# Patient Record
Sex: MALE | Race: WHITE
[De-identification: names, ages, dates, MRNs, and addresses within clinical notes are randomized per-mention and may not be internally consistent; named-entity substitution may affect disease eponyms.]

---

## 2017-06-29 ENCOUNTER — HOSPITAL ENCOUNTER (OUTPATIENT)
Dept: HOSPITAL 35 - RAD | Age: 67
End: 2017-06-29
Attending: FAMILY MEDICINE
Payer: COMMERCIAL

## 2017-06-29 DIAGNOSIS — N20.0: Primary | ICD-10-CM

## 2021-03-08 ENCOUNTER — HOSPITAL ENCOUNTER (OUTPATIENT)
Dept: HOSPITAL 35 - ULTRA | Age: 71
End: 2021-03-08
Attending: FAMILY MEDICINE
Payer: COMMERCIAL

## 2021-03-08 DIAGNOSIS — G45.9: Primary | ICD-10-CM

## 2021-03-08 DIAGNOSIS — I63.9: ICD-10-CM

## 2021-03-08 DIAGNOSIS — R09.89: ICD-10-CM

## 2021-03-25 ENCOUNTER — HOSPITAL ENCOUNTER (OUTPATIENT)
Dept: HOSPITAL 35 - CAT | Age: 71
End: 2021-03-25
Attending: FAMILY MEDICINE
Payer: COMMERCIAL

## 2021-03-25 DIAGNOSIS — I65.21: Primary | ICD-10-CM

## 2021-03-25 DIAGNOSIS — R09.89: ICD-10-CM

## 2021-03-25 LAB
BUN SERPL-MCNC: 25 MG/DL (ref 7–18)
CREAT SERPL-MCNC: 1.2 MG/DL (ref 0.7–1.3)

## 2021-04-06 ENCOUNTER — HOSPITAL ENCOUNTER (OUTPATIENT)
Dept: HOSPITAL 35 - CAT | Age: 71
End: 2021-04-06
Attending: INTERNAL MEDICINE
Payer: COMMERCIAL

## 2021-04-06 ENCOUNTER — HOSPITAL ENCOUNTER (OUTPATIENT)
Dept: HOSPITAL 35 - SJCVC | Age: 71
End: 2021-04-06
Attending: RADIOLOGY
Payer: COMMERCIAL

## 2021-04-06 DIAGNOSIS — E78.2: ICD-10-CM

## 2021-04-06 DIAGNOSIS — Z13.6: Primary | ICD-10-CM

## 2021-04-06 DIAGNOSIS — E78.00: ICD-10-CM

## 2021-04-06 DIAGNOSIS — E11.9: ICD-10-CM

## 2021-04-06 DIAGNOSIS — Z88.5: ICD-10-CM

## 2021-04-06 DIAGNOSIS — I10: ICD-10-CM

## 2021-04-06 DIAGNOSIS — Z79.899: ICD-10-CM

## 2021-04-06 DIAGNOSIS — Z79.84: ICD-10-CM

## 2021-04-06 DIAGNOSIS — I77.9: Primary | ICD-10-CM

## 2021-04-06 DIAGNOSIS — I65.23: ICD-10-CM

## 2021-04-06 DIAGNOSIS — Z87.891: ICD-10-CM

## 2021-04-06 DIAGNOSIS — I25.10: ICD-10-CM

## 2021-04-06 DIAGNOSIS — R07.9: ICD-10-CM

## 2021-04-06 DIAGNOSIS — Z72.89: ICD-10-CM

## 2021-04-14 ENCOUNTER — HOSPITAL ENCOUNTER (INPATIENT)
Dept: HOSPITAL 35 - CATH | Age: 71
LOS: 9 days | Discharge: HOME | DRG: 233 | End: 2021-04-23
Attending: RADIOLOGY | Admitting: RADIOLOGY
Payer: COMMERCIAL

## 2021-04-14 VITALS — DIASTOLIC BLOOD PRESSURE: 68 MMHG | SYSTOLIC BLOOD PRESSURE: 137 MMHG

## 2021-04-14 VITALS — SYSTOLIC BLOOD PRESSURE: 136 MMHG | DIASTOLIC BLOOD PRESSURE: 62 MMHG

## 2021-04-14 VITALS — WEIGHT: 185.6 LBS | HEIGHT: 67.99 IN | BODY MASS INDEX: 28.13 KG/M2

## 2021-04-14 VITALS — SYSTOLIC BLOOD PRESSURE: 139 MMHG | DIASTOLIC BLOOD PRESSURE: 67 MMHG

## 2021-04-14 VITALS — DIASTOLIC BLOOD PRESSURE: 59 MMHG | SYSTOLIC BLOOD PRESSURE: 125 MMHG

## 2021-04-14 VITALS — SYSTOLIC BLOOD PRESSURE: 129 MMHG | DIASTOLIC BLOOD PRESSURE: 65 MMHG

## 2021-04-14 VITALS — DIASTOLIC BLOOD PRESSURE: 66 MMHG | SYSTOLIC BLOOD PRESSURE: 137 MMHG

## 2021-04-14 DIAGNOSIS — Z81.8: ICD-10-CM

## 2021-04-14 DIAGNOSIS — D62: ICD-10-CM

## 2021-04-14 DIAGNOSIS — E78.5: ICD-10-CM

## 2021-04-14 DIAGNOSIS — Z20.822: ICD-10-CM

## 2021-04-14 DIAGNOSIS — E43: ICD-10-CM

## 2021-04-14 DIAGNOSIS — E11.9: ICD-10-CM

## 2021-04-14 DIAGNOSIS — I25.10: Primary | ICD-10-CM

## 2021-04-14 DIAGNOSIS — Z79.899: ICD-10-CM

## 2021-04-14 DIAGNOSIS — Z83.3: ICD-10-CM

## 2021-04-14 DIAGNOSIS — Z88.6: ICD-10-CM

## 2021-04-14 DIAGNOSIS — Z79.82: ICD-10-CM

## 2021-04-14 DIAGNOSIS — I65.21: ICD-10-CM

## 2021-04-14 DIAGNOSIS — I10: ICD-10-CM

## 2021-04-14 LAB
ALBUMIN SERPL-MCNC: 3.3 G/DL (ref 3.4–5)
ALT SERPL-CCNC: 32 U/L (ref 30–65)
ANION GAP SERPL CALC-SCNC: 10 MMOL/L (ref 7–16)
ANION GAP SERPL CALC-SCNC: 9 MMOL/L (ref 7–16)
APTT BLD: 27 SECONDS (ref 24.5–32.8)
AST SERPL-CCNC: 25 U/L (ref 15–37)
BASOPHILS NFR BLD AUTO: 0.8 % (ref 0–2)
BILIRUB SERPL-MCNC: 0.5 MG/DL (ref 0.2–1)
BILIRUB UR-MCNC: NEGATIVE MG/DL
BUN SERPL-MCNC: 18 MG/DL (ref 7–18)
BUN SERPL-MCNC: 21 MG/DL (ref 7–18)
CALCIUM SERPL-MCNC: 8.4 MG/DL (ref 8.5–10.1)
CALCIUM SERPL-MCNC: 9.2 MG/DL (ref 8.5–10.1)
CHLORIDE SERPL-SCNC: 105 MMOL/L (ref 98–107)
CHLORIDE SERPL-SCNC: 107 MMOL/L (ref 98–107)
CO2 SERPL-SCNC: 26 MMOL/L (ref 21–32)
CO2 SERPL-SCNC: 28 MMOL/L (ref 21–32)
COLOR UR: YELLOW
CREAT SERPL-MCNC: 1.1 MG/DL (ref 0.7–1.3)
CREAT SERPL-MCNC: 1.2 MG/DL (ref 0.7–1.3)
EOSINOPHIL NFR BLD: 3.7 % (ref 0–3)
ERYTHROCYTE [DISTWIDTH] IN BLOOD BY AUTOMATED COUNT: 13.2 % (ref 10.5–14.5)
GLUCOSE SERPL-MCNC: 140 MG/DL (ref 74–106)
GLUCOSE SERPL-MCNC: 176 MG/DL (ref 74–106)
GRANULOCYTES NFR BLD MANUAL: 54.7 % (ref 36–66)
HCT VFR BLD CALC: 44.9 % (ref 42–52)
HGB BLD-MCNC: 15.1 GM/DL (ref 14–18)
INR PPP: 1.05
KETONES UR STRIP-MCNC: NEGATIVE MG/DL
LYMPHOCYTES NFR BLD AUTO: 32.2 % (ref 24–44)
MCH RBC QN AUTO: 33.9 PG (ref 26–34)
MCHC RBC AUTO-ENTMCNC: 33.6 G/DL (ref 28–37)
MCV RBC: 100.8 FL (ref 80–100)
MONOCYTES NFR BLD: 8.6 % (ref 1–8)
NEUTROPHILS # BLD: 3.4 THOU/UL (ref 1.4–8.2)
PLATELET # BLD: 180 THOU/UL (ref 150–400)
POTASSIUM SERPL-SCNC: 4.5 MMOL/L (ref 3.5–5.1)
POTASSIUM SERPL-SCNC: 4.6 MMOL/L (ref 3.5–5.1)
PROT SERPL-MCNC: 6 G/DL (ref 6.4–8.2)
PROTHROMBIN TIME: 11.4 SECONDS (ref 9.3–11.4)
RBC # BLD AUTO: 4.46 MIL/UL (ref 4.5–6)
RBC # UR STRIP: NEGATIVE /UL
SODIUM SERPL-SCNC: 141 MMOL/L (ref 136–145)
SODIUM SERPL-SCNC: 144 MMOL/L (ref 136–145)
SP GR UR STRIP: 1.01 (ref 1–1.03)
URINE CLARITY: CLEAR
URINE GLUCOSE-RANDOM*: (no result)
URINE LEUKOCYTES-REFLEX: NEGATIVE
URINE NITRITE-REFLEX: NEGATIVE
URINE PROTEIN (DIPSTICK): NEGATIVE
UROBILINOGEN UR STRIP-ACNC: 0.2 E.U./DL (ref 0.2–1)
WBC # BLD AUTO: 6.3 THOU/UL (ref 4–11)

## 2021-04-14 PROCEDURE — B4181ZZ FLUOROSCOPY OF BILATERAL RENAL ARTERIES USING LOW OSMOLAR CONTRAST: ICD-10-PCS

## 2021-04-14 PROCEDURE — 50101: CPT

## 2021-04-14 PROCEDURE — 52131: CPT

## 2021-04-14 PROCEDURE — 52279: CPT

## 2021-04-14 PROCEDURE — B2151ZZ FLUOROSCOPY OF LEFT HEART USING LOW OSMOLAR CONTRAST: ICD-10-PCS

## 2021-04-14 PROCEDURE — 83006 GROWTH STIMULATION GENE 2: CPT

## 2021-04-14 PROCEDURE — B4101ZZ FLUOROSCOPY OF ABDOMINAL AORTA USING LOW OSMOLAR CONTRAST: ICD-10-PCS | Performed by: RADIOLOGY

## 2021-04-14 PROCEDURE — 4A023N7 MEASUREMENT OF CARDIAC SAMPLING AND PRESSURE, LEFT HEART, PERCUTANEOUS APPROACH: ICD-10-PCS | Performed by: INTERNAL MEDICINE

## 2021-04-14 PROCEDURE — 50386 REMOVE STENT VIA TRANSURETH: CPT

## 2021-04-14 PROCEDURE — 10203: CPT

## 2021-04-14 PROCEDURE — 62900: CPT

## 2021-04-14 PROCEDURE — 52259: CPT

## 2021-04-14 PROCEDURE — 53358: CPT

## 2021-04-14 PROCEDURE — B41F1ZZ FLUOROSCOPY OF RIGHT LOWER EXTREMITY ARTERIES USING LOW OSMOLAR CONTRAST: ICD-10-PCS

## 2021-04-14 PROCEDURE — 50010 RENAL EXPLORATION: CPT

## 2021-04-14 PROCEDURE — B2111ZZ FLUOROSCOPY OF MULTIPLE CORONARY ARTERIES USING LOW OSMOLAR CONTRAST: ICD-10-PCS

## 2021-04-14 PROCEDURE — 47000 NEEDLE BIOPSY OF LIVER PERQ: CPT

## 2021-04-14 PROCEDURE — 48889: CPT

## 2021-04-14 PROCEDURE — 47002: CPT

## 2021-04-14 PROCEDURE — 62110: CPT

## 2021-04-14 PROCEDURE — 62950: CPT

## 2021-04-14 PROCEDURE — 47001 NDL BIOPSY LVR TM OTH MAJ PX: CPT

## 2021-04-14 PROCEDURE — 47297: CPT

## 2021-04-14 PROCEDURE — B3151ZZ FLUOROSCOPY OF BILATERAL COMMON CAROTID ARTERIES USING LOW OSMOLAR CONTRAST: ICD-10-PCS

## 2021-04-14 PROCEDURE — 50249: CPT

## 2021-04-14 PROCEDURE — 65020: CPT

## 2021-04-14 PROCEDURE — 65090: CPT

## 2021-04-14 PROCEDURE — 10078: CPT

## 2021-04-14 PROCEDURE — 50417: CPT

## 2021-04-14 PROCEDURE — 10081 I&D PILONIDAL CYST COMP: CPT

## 2021-04-14 PROCEDURE — B31F1ZZ FLUOROSCOPY OF LEFT VERTEBRAL ARTERY USING LOW OSMOLAR CONTRAST: ICD-10-PCS

## 2021-04-14 PROCEDURE — 50455: CPT

## 2021-04-14 PROCEDURE — 47375: CPT

## 2021-04-14 PROCEDURE — 50668: CPT

## 2021-04-14 PROCEDURE — 30233R1 TRANSFUSION OF NONAUTOLOGOUS PLATELETS INTO PERIPHERAL VEIN, PERCUTANEOUS APPROACH: ICD-10-PCS

## 2021-04-14 PROCEDURE — 65120: CPT

## 2021-04-14 PROCEDURE — 65135 INSERT OCULAR IMPLANT: CPT

## 2021-04-14 PROCEDURE — 50011: CPT

## 2021-04-14 PROCEDURE — B41G1ZZ FLUOROSCOPY OF LEFT LOWER EXTREMITY ARTERIES USING LOW OSMOLAR CONTRAST: ICD-10-PCS

## 2021-04-14 PROCEDURE — 52287 CYSTOSCOPY CHEMODENERVATION: CPT

## 2021-04-14 PROCEDURE — 51301: CPT

## 2021-04-14 NOTE — EKG
[place] [address] [facility] [email or web address]
[place]  [address]
Phone:  [phone]                    ELECTROCARDIOGRAM REPORT      
_______________________________________________________________________________
 
Name:       EILEEN WALTERS [facility]           [facility] #:                     [facility]      [id number]     Account #:      [id number]  
Admission:              Attend Phys:    Liu Savage MD  
Discharge:              Date of Birth:  [birth year]  
                                                          Report #: [id number]
   [id number]
_______________________________________________________________________________
                          [facility]
                                       
Test Date:    2021               Test Time:    11:23:38
Pat Name:     EILEEN WALTERS             Department:   
Patient ID:   [id number]            Room:          
Gender:       M                        Technician:   SADAF
:          1950               Requested By: Aquiles Joiner
Order Number: [id number] MD:   Herve Alvarado
                                 Measurements
Intervals                              Axis          
Rate:         65                       P:            68
MI:           167                      QRS:          53
QRSD:         96                       T:            24
QT:           401                                    
QTc:          417                                    
                           Interpretive Statements
Sinus rhythm
Abnormal R-wave progression, late transition
No previous ECG available for comparison
Electronically Signed On 2021 15:13:53 CDT by Herve Alvarado
[email or web address]
 
 
 
 
 
 
 
 
 
 
 
 
 
 
 
 
 
 
 
 
 
 
  <ELECTRONICALLY SIGNED>
   By: Herve Alvarado MD, [facility]   
  21     1513
D: 21 1123                           _____________________________________
T: 21 1123                           Herve Alvarado MD, FAC     /EPI

## 2021-04-14 NOTE — NUR
ASSUMED CARE OF PT AT APPROX 1700 FROM IR. ADMISSION COMPLETE. PT ORIENTED TO
ROOM. R GROIN CDI, NO BRUISING OR HEMATOMA. FLUIDS INFUSING. BEDREST COMPLETE
AT 1830. PLAN FOR ENDARTERECTOMY TOMORROW. WILL CONTINUE TO MONITOR FOR
CHANGES AND FOLLOW POC.

## 2021-04-14 NOTE — CATHLAB
[facility]
[address]
[place]   [address]                   INVASIVE PROCEDURE REPORT     
_______________________________________________________________________________
 
Name:       EILEEN WALTERS [facility]           Room #:         219-P       [facility] 
M.R.#:      [id number]                       Account #:      [id number]  
Admission:  04/14/21    Attend Phys:    Liu Savage MD  
Discharge:              Date of Birth:  [birth year]  
                                                          Report #: [id number]
                                                                    [id number]
_______________________________________________________________________________
THIS REPORT FOR:  
 
cc:  Bhanu Hutchinson MD, Neal A. MD Mancuso, Gerald M. MD [facility]                                        
                                                                       ~
 
--------------- APPROVED REPORT --------------
 
 
Study performed:  04/14/2021 14:38:20
 
Patient Details
Patient Status: Out-Patient                  Room #: 
The patient is a 70 year-old male
 
Event Personnel
Vini Turenr RN RN, Tenisha Leach RN RN, Pricilla Little RN RN, 
Swapna Flores [place], Sherra RTR Monitor, Marylu García RN RN, Aquiles Joiner  Interventional Cardiologist
 
Procedures Performed
Left Heart Cath w/or w/o Coronaries [id number] [facility] [address]107 Initial Mod Sed 
Same Phys/QHP [facility] [id number] 70004 Mod Sed Same Phys/QHP Ea 
[id number]
 
Procedure Narrative
The was infiltrated with 1% Lidocaine subcutaneous anesthesia. A 
SHEATH BRITE-TIP 6F X 11CM ([id number]) sheath was inserted into the 
RFA^. Coronary angiography was performed using coronary diagnostic 
catheters. The right coronary system was accessed and visualized with 
a JR4 catheter. The left coronary system was accessed and visualized 
with a JL4 catheter. The left ventricle was accessed and visualized 
with a PIGTAIL catheter. Left ventriculogram was performed in 30 
degree projection. Closure device was deployed with a 6 Fr MYNXGRIP 
6/7F #[id number]. Hemostasis was obtained with manual pressure following 
sheath removal without any complications. The patient tolerated the 
procedure well and there were no complications associated with the 
procedure. There was no hematoma.
 
Intraoperative Conscious Sedation
Sedation start time:  1320           Case end Time:  
1529    
Fentanyl  50 mcg    Versed  0.5 mg  
 
Fluoro Time:    10.40 minutes     
 
 
[facility]
[address]
[place]  [address]
Phone:  [phone]                    INVASIVE PROCEDURE REPORT     
_______________________________________________________________________________
 
Name:            EILEEN WALTERS [facility]           Room #:        219-P       [facility] [place]
[facility]#:           [id number]          Account #:     [id number]  
Admission:       04/14/21         Attend Phys:   Liu Savage, 
Discharge:                  Date of Birth: [birth year]  
                         Report #:      [id number]
        [id number]
_______________________________________________________________________________
Dose:     DAP 39977.90 cGycm2  2368 mGy  
Contrast Type and Amount:  Omnipaque 217 ml    
 
Hemodynamics
The aortic pressure is 111/55 mmHg with a mean of 59 mmHg. The left 
ventricular pressure is 118/8 mmHg with a mean of mmHg. The left 
ventricular end diastolic pressure is 20 mmHg. 
 
Conclusion
#1.  Normal left ventricular size and systolic function lower limits 
of normal subtle anterior apical wall leg.
#2 Long left main with a significant eccentric lesion the entire 
length extending into the ostial LAD.  70% left main lesion
#3 ostial LAD eccentric lesion of 70% there may have been a 
previously placed stent or calcification noted which is widely patent 
just distal to the segment.  LAD diffusely diseased around the 
apex.
#4 circumflex OM is well preserved and dominant vessel.  No occlusive 
disease.  It does give off the PDA.  First OM branch has an eccentric 
40% lesion
#5 nondominant RCA mildly diseased
 
Recommendations and plan: Continue aggressive risk factor 
modification.  Will need revascularization by bypass.  Patient also 
with high-grade carotid disease further consultation with CV 
surgery.
 
 
 
 
 
 
 
 
 
 
 
 
 
 
 
 
 
 
  <ELECTRONICALLY SIGNED>
   By: Aquiles Joiner MD, FACC   
  04/14/21     1739
D: 04/14/21 1739                           _____________________________________
T: 04/14/21 1739                           Aquiles Joiner MD, FACC     /INF

## 2021-04-15 VITALS — DIASTOLIC BLOOD PRESSURE: 45 MMHG | SYSTOLIC BLOOD PRESSURE: 81 MMHG

## 2021-04-15 VITALS — SYSTOLIC BLOOD PRESSURE: 92 MMHG | DIASTOLIC BLOOD PRESSURE: 41 MMHG

## 2021-04-15 VITALS — SYSTOLIC BLOOD PRESSURE: 148 MMHG | DIASTOLIC BLOOD PRESSURE: 64 MMHG

## 2021-04-15 VITALS — SYSTOLIC BLOOD PRESSURE: 85 MMHG | DIASTOLIC BLOOD PRESSURE: 49 MMHG

## 2021-04-15 VITALS — SYSTOLIC BLOOD PRESSURE: 131 MMHG | DIASTOLIC BLOOD PRESSURE: 75 MMHG

## 2021-04-15 VITALS — SYSTOLIC BLOOD PRESSURE: 91 MMHG | DIASTOLIC BLOOD PRESSURE: 44 MMHG

## 2021-04-15 VITALS — SYSTOLIC BLOOD PRESSURE: 89 MMHG | DIASTOLIC BLOOD PRESSURE: 39 MMHG

## 2021-04-15 LAB
ANION GAP SERPL CALC-SCNC: 9 MMOL/L (ref 7–16)
BUN SERPL-MCNC: 16 MG/DL (ref 7–18)
CALCIUM SERPL-MCNC: 8.7 MG/DL (ref 8.5–10.1)
CHLORIDE SERPL-SCNC: 107 MMOL/L (ref 98–107)
CO2 SERPL-SCNC: 26 MMOL/L (ref 21–32)
CREAT SERPL-MCNC: 1.1 MG/DL (ref 0.7–1.3)
ERYTHROCYTE [DISTWIDTH] IN BLOOD BY AUTOMATED COUNT: 13 % (ref 10.5–14.5)
GLUCOSE SERPL-MCNC: 160 MG/DL (ref 74–106)
HCT VFR BLD CALC: 42.5 % (ref 42–52)
HGB BLD-MCNC: 14.3 GM/DL (ref 14–18)
MCH RBC QN AUTO: 33.9 PG (ref 26–34)
MCHC RBC AUTO-ENTMCNC: 33.7 G/DL (ref 28–37)
MCV RBC: 100.6 FL (ref 80–100)
PLATELET # BLD: 163 THOU/UL (ref 150–400)
POTASSIUM SERPL-SCNC: 4.1 MMOL/L (ref 3.5–5.1)
RBC # BLD AUTO: 4.22 MIL/UL (ref 4.5–6)
SODIUM SERPL-SCNC: 142 MMOL/L (ref 136–145)
WBC # BLD AUTO: 6.6 THOU/UL (ref 4–11)

## 2021-04-15 PROCEDURE — 03CK0ZZ EXTIRPATION OF MATTER FROM RIGHT INTERNAL CAROTID ARTERY, OPEN APPROACH: ICD-10-PCS | Performed by: THORACIC SURGERY (CARDIOTHORACIC VASCULAR SURGERY)

## 2021-04-15 PROCEDURE — 03UK0KZ SUPPLEMENT RIGHT INTERNAL CAROTID ARTERY WITH NONAUTOLOGOUS TISSUE SUBSTITUTE, OPEN APPROACH: ICD-10-PCS

## 2021-04-15 NOTE — NUR
This RN called Dr. Kessler to discuss low blood pressures and received new
orders. Also discussed surgical site dressing bleeding. Not concerned and told
will change in the morning. Will continue to monitor.

## 2021-04-15 NOTE — EKG
[place] [address] [facility]
[place]  [address]
Phone:  [phone]                    ELECTROCARDIOGRAM REPORT      
_______________________________________________________________________________
 
Name:       EILEEN WALTERS [facility]           Room #:         219-P       [facility] 
M.R.#:      [id number]     Account #:      [id number]  
Admission:  21    Attend Phys:    Liu Savage MD  
Discharge:              Date of Birth:  [birth year]  
                                                          Report #: [id number]
   [id number]
_______________________________________________________________________________
                          [facility]
                                       
Test Date:    2021               Test Time:    15:46:38
Pat Name:     EILEEN WALTERS             Department:   
Patient ID:   [id number]            Room:         219
Gender:       M                        Technician:   FSCHWALBE
:          1950               Requested By: Vamsi Kessler
Order Number: [id number] MD:   Justin Arias
                                 Measurements
Intervals                              Axis          
Rate:         70                       P:            63
TN:           183                      QRS:          55
QRSD:         97                       T:            20
QT:           398                                    
QTc:          430                                    
                           Interpretive Statements
Sinus rhythm
Baseline wander in lead(s) V6
Compared to ECG 2021 11:23:38
No significant changes
Electronically Signed On 4- 7:06:13 CDT by Justin Arias
[email or web address]
 
 
 
 
 
 
 
 
 
 
 
 
 
 
 
 
 
 
 
 
 
  <ELECTRONICALLY SIGNED>
   By: Justin Arias MD, [facility]    
  04/15/21     0706
D: 041546                           _____________________________________
T: 21 154                           Justin Arias MD, FACC      /EPI

## 2021-04-15 NOTE — HC
[facility]
[address]
[place]   [address]                     CONSULTATION                  
_______________________________________________________________________________
 
Name:       EILEEN WALTERS [facility]           Room #:         248-P       [place]  
M.R.#:      [id number]                       Account #:      [id number]  
Admission:  04/15/21    Attend Phys:    Liu Savage MD  
Discharge:              Date of Birth:  [birth year]  
                                                          Report #: [id number]
                                                                    [id number]   
_______________________________________________________________________________
THIS REPORT FOR:  
 
cc:  Bhanu Hutchinson MD, Neal A. MD Forman, John M. MD                                            ~
 
DATE OF SERVICE:  04/14/2021
 
 
We were asked by Dr. Savage and Dr. Joiner to see the patient.
 
INDICATIONS:  The patient is a 70-year-old with asymptomatic right carotid
bruit.  This was evaluated with the duplex and CT angiogram and found to be
severe.  Arteriography today demonstrates a 98% proximal right internal carotid
stenosis.  The left carotid has trivial disease.
 
The patient also has a history of occasional exertional angina.  The patient
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
and generally unimpaired in his activities.  However, cardiac catheterization
today demonstrates an 80% tubular left main stenosis and a left dominant system.
 Left ventricular function is satisfactory.  This lesion did not dilate with
intracoronary nitroglycerin.
 
PAST HISTORY:  Significant for diabetes mellitus, hypertension and
hyperlipidemia.  The patient denies previous surgery.
 
FAMILY HISTORY:  Reveals diabetes in mother, Alzheimer's disease in father.
 
SOCIAL HISTORY:  The patient is a former smoker who quit in 1982.
 
ALLERGIES:  LORTAB CAUSES SOME UPPER RESPIRATORY WHEEZING.
 
REVIEW OF SYSTEMS:
GENERAL:  No weight change.  No fever.  EYES:  No vision change.
HEENT:  No headache, hearing problems, sinus problems.
CARDIAC:  As mentioned, rare exertional angina, no palpitations.  No rest
angina.
RESPIRATORY:  No cough, shortness of breath, or sputum.
GASTROINTESTINAL:  No nausea, vomiting, blood in stools.
GENITOURINARY:  No urgency, frequency, blood in urine.
MUSCULOSKELETAL:  No bone or joint pain.
SKIN:  No rash or infection.
NEUROLOGIC:  No motor or sensory dysfunction.
HEMATOLOGIC:  No bruisability or bleeding.  Not on blood thinners.
ENDOCRINE:  No goiter, no tremor.
 
 
 
 
[facility]
[address] [place]   [address]                     CONSULTATION                  
_______________________________________________________________________________
 
Name:       EILEEN WALTERS [facility]           Room #:         248-P       [facility] [place]  
.R.#:      [id number]                       Account #:      [id number]  
Admission:  04/15/21    Attend Phys:    Liu Savage MD  
Discharge:              Date of Birth:  [birth year]  
                                                          Report #: [id number]
                                                                    [id number]   
_______________________________________________________________________________
 
PHYSICAL EXAMINATION:
GENERAL:  The patient is lying in bed, post-cardiac catheterization, pleasant
fellow short in stature.  Mesomorphic habitus.
VITAL SIGNS:  Blood pressure 140/60, heart rate 72, respiratory rate 18.
HEENT:  No scleral icterus, no arcus.
NECK:  No mass.  Right side cervical bruit.
CHEST:  Clear to auscultation.
HEART:  Rhythm regular, no murmur.
ABDOMEN:  Soft, no mass.
EXTREMITIES:  No clubbing, cyanosis or edema.
VASCULAR:  2+ popliteal pulses.  No obvious saphenous vein problems.
SKIN:  No rash or infection.
NEUROLOGIC:  No motor or sensory dysfunction.
PSYCHIATRIC:  Shows insight into problem and is oriented and appropriate.
 
ASSESSMENT:  The patient has high-grade right carotid artery stenosis and
important left main lesion in this patient.  I believe the carotid lesion is of
more urgency.  Risks and details of carotid endarterectomy were discussed.  I
have reviewed the case with Dr. Savage and neither one of us believe that TCAR
would be a better option.  We have arranged for carotid endarterectomy for
tomorrow afternoon with the expectation that coronary bypass surgery would be
done within the next 2 weeks or so.  Risks and details, options and
alternatives, were discussed.  Risks of carotid surgery include but are not
limited to bleeding, infection, anesthesia risks, stroke and neurologic
dysfunction.  The decision regarding staging of the two operations versus a
combined operation was also discussed.  In my view in this patient this is the
safest approach.  The patient and his wife understand and agree with all of this
and are prepared for surgery for tomorrow.
 
Thank you for the consult.
 
 
 
 
 
 
 
 
 
 
 
 
 
  <ELECTRONICALLY SIGNED>
   By: Vamsi Kessler MD            
  04/15/21     1611
D: 04/14/21 1617                           _____________________________________
T: 04/14/21 1757                           Vamsi Kessler MD              /nt

## 2021-04-15 NOTE — NUR
FROM RR 1445. PT IMMED C/O PAIN FROM MERIDA CATHETER. WARM BLANKET ACROSS LOWER
ABD. STATES OP PAIN IS VERY LITTLE. BPS LOW 80'S ON ARRIVAL. MAGALYS (CV PA) IN
& 250ML NS FLD BOLUS GIVEN OVER 1/2 HR. BPS NOW >100mmHG. MUCH REASSURANCE
GIVEN TO PT & WIFE. LIGHT WT ICEPACK TO RT NECK.--VW

## 2021-04-15 NOTE — O
[facility]
[address]
[place]   [address]                     OPERATIVE REPORT              
_______________________________________________________________________________
 
Name:       EILEEN WALTERS [facility]           Room #:         248-P       [place]  
M.R.#:      [id number]                       Account #:      [id number]  
Admission:  04/15/21    Attend Phys:    Liu Savage MD  
Discharge:              Date of Birth:  [birth year]  
                                                          Report #: [id number]
                                                                    [id number]   
_______________________________________________________________________________
THIS REPORT FOR:  
 
cc:  Bhanu Hutchinson MD, Neal A. MD Forman,Vamsi KUMAR MD                                            ~
 
DATE OF SERVICE:  04/15/2021
 
 
PREOPERATIVE DIAGNOSIS:  Right carotid artery stenosis.
 
POSTOPERATIVE DIAGNOSIS:  Right carotid artery stenosis.
 
OPERATION:  Right carotid endarterectomy with patch closure.
 
SURGEON:  Vamsi Kessler MD
 
ASSISTANT:  SOPHIA Walsh.
 
ANESTHESIA:  General.
 
INDICATIONS:  The patient is a 70-year-old with a 98% right internal carotid
stenosis.  This was found as an asymptomatic bruit.  The patient also has
coronary artery disease, which is asymptomatic, although he does have an 80%
tubular left main stenosis and a left dominant system.  Left carotid has trivial
disease.
 
FINDINGS AND TECHNIQUE:  After general anesthesia was established, an oblique
right neck incision was made.  Common facial vein was divided.  Common internal
and external carotid arteries were identified and controlled, 10,000 units of
heparin were given.
 
Continuous electroencephalographic monitoring was performed.  When the carotid
vessels were occluded, no EEG changes were noted.
 
The carotid arteriotomy was made.  The endarterectomy was performed without
creating a distal flap.  Neointima was inspected and all loose debris was
removed.  Tacking sutures were placed in the transition zone.
 
When the endarterectomy was deemed satisfactory, the arteriotomy was closed with
thin walled pericardial patch and running Prolene.  Prior to finishing the
closure, the carotid vessels were backbled and the artery was irrigated with
heparinized saline.  Flow was established first through the external, then the
internal carotid artery.
 
Protamine was given to reverse the heparin.  When hemostasis was satisfactory, a
 
 
 
[facility]
[address]
[place]   [address]                     OPERATIVE REPORT              
_______________________________________________________________________________
 
Name:       EILEEN WALTERS [facility]           Room #:         248-P       [facility] [place]  
M.R.#:      [id number]                       Account #:      [id number]  
Admission:  04/15/21    Attend Phys:    Liu Savage MD  
Discharge:              Date of Birth:  [birth year]  
                                                          Report #: [id number]
                                                                    [id number]   
_______________________________________________________________________________
 
Penrose drain was brought out through the bottom pole of the incision and the
wound was closed in layers.  The patient was taken to the recovery area in good
condition, where his neurologic progress was monitored.  All counts reported as
correct.
 
 
 
 
 
 
 
 
 
 
 
 
 
 
 
 
 
 
 
 
 
 
 
 
 
 
 
 
 
 
 
 
 
 
 
 
 
 
 
  <ELECTRONICALLY SIGNED>
   By: Vamsi Kessler MD            
  04/15/21     1611
D: 04/15/21 1524                           _____________________________________
T: 04/15/21 1542                           Vamsi Kessler MD              /nt

## 2021-04-16 VITALS — SYSTOLIC BLOOD PRESSURE: 98 MMHG | DIASTOLIC BLOOD PRESSURE: 44 MMHG

## 2021-04-16 VITALS — DIASTOLIC BLOOD PRESSURE: 53 MMHG | SYSTOLIC BLOOD PRESSURE: 109 MMHG

## 2021-04-16 VITALS — SYSTOLIC BLOOD PRESSURE: 123 MMHG | DIASTOLIC BLOOD PRESSURE: 43 MMHG

## 2021-04-16 VITALS — DIASTOLIC BLOOD PRESSURE: 53 MMHG | SYSTOLIC BLOOD PRESSURE: 104 MMHG

## 2021-04-16 VITALS — SYSTOLIC BLOOD PRESSURE: 91 MMHG | DIASTOLIC BLOOD PRESSURE: 38 MMHG

## 2021-04-16 VITALS — SYSTOLIC BLOOD PRESSURE: 120 MMHG | DIASTOLIC BLOOD PRESSURE: 39 MMHG

## 2021-04-16 VITALS — DIASTOLIC BLOOD PRESSURE: 49 MMHG | SYSTOLIC BLOOD PRESSURE: 116 MMHG

## 2021-04-16 VITALS — SYSTOLIC BLOOD PRESSURE: 117 MMHG | DIASTOLIC BLOOD PRESSURE: 48 MMHG

## 2021-04-16 VITALS — DIASTOLIC BLOOD PRESSURE: 46 MMHG | SYSTOLIC BLOOD PRESSURE: 109 MMHG

## 2021-04-16 VITALS — DIASTOLIC BLOOD PRESSURE: 47 MMHG | SYSTOLIC BLOOD PRESSURE: 107 MMHG

## 2021-04-16 VITALS — SYSTOLIC BLOOD PRESSURE: 109 MMHG | DIASTOLIC BLOOD PRESSURE: 43 MMHG

## 2021-04-16 VITALS — SYSTOLIC BLOOD PRESSURE: 110 MMHG | DIASTOLIC BLOOD PRESSURE: 53 MMHG

## 2021-04-16 VITALS — DIASTOLIC BLOOD PRESSURE: 38 MMHG | SYSTOLIC BLOOD PRESSURE: 94 MMHG

## 2021-04-16 VITALS — DIASTOLIC BLOOD PRESSURE: 44 MMHG | SYSTOLIC BLOOD PRESSURE: 92 MMHG

## 2021-04-16 VITALS — SYSTOLIC BLOOD PRESSURE: 110 MMHG | DIASTOLIC BLOOD PRESSURE: 51 MMHG

## 2021-04-16 VITALS — DIASTOLIC BLOOD PRESSURE: 47 MMHG | SYSTOLIC BLOOD PRESSURE: 102 MMHG

## 2021-04-16 VITALS — SYSTOLIC BLOOD PRESSURE: 93 MMHG | DIASTOLIC BLOOD PRESSURE: 48 MMHG

## 2021-04-16 VITALS — SYSTOLIC BLOOD PRESSURE: 111 MMHG | DIASTOLIC BLOOD PRESSURE: 50 MMHG

## 2021-04-16 VITALS — DIASTOLIC BLOOD PRESSURE: 53 MMHG | SYSTOLIC BLOOD PRESSURE: 106 MMHG

## 2021-04-16 LAB
ANION GAP SERPL CALC-SCNC: 11 MMOL/L (ref 7–16)
APTT BLD: 27.3 SECONDS (ref 24.5–32.8)
BILIRUB UR-MCNC: NEGATIVE MG/DL
BUN SERPL-MCNC: 17 MG/DL (ref 7–18)
CALCIUM SERPL-MCNC: 7.7 MG/DL (ref 8.5–10.1)
CHLORIDE SERPL-SCNC: 110 MMOL/L (ref 98–107)
CO2 SERPL-SCNC: 22 MMOL/L (ref 21–32)
COLOR UR: YELLOW
CREAT SERPL-MCNC: 1.2 MG/DL (ref 0.7–1.3)
ERYTHROCYTE [DISTWIDTH] IN BLOOD BY AUTOMATED COUNT: 13.4 % (ref 10.5–14.5)
GLUCOSE SERPL-MCNC: 164 MG/DL (ref 74–106)
HCT VFR BLD CALC: 37.3 % (ref 42–52)
HGB BLD-MCNC: 12.7 GM/DL (ref 14–18)
INR PPP: 1.06
KETONES UR STRIP-MCNC: NEGATIVE MG/DL
MCH RBC QN AUTO: 34.3 PG (ref 26–34)
MCHC RBC AUTO-ENTMCNC: 33.9 G/DL (ref 28–37)
MCV RBC: 101.1 FL (ref 80–100)
PLATELET # BLD: 181 THOU/UL (ref 150–400)
POTASSIUM SERPL-SCNC: 4.4 MMOL/L (ref 3.5–5.1)
PROTHROMBIN TIME: 11.5 SECONDS (ref 9.3–11.4)
RBC # BLD AUTO: 3.69 MIL/UL (ref 4.5–6)
RBC # UR STRIP: NEGATIVE /UL
SODIUM SERPL-SCNC: 143 MMOL/L (ref 136–145)
SP GR UR STRIP: 1.01 (ref 1–1.03)
URINE CLARITY: CLEAR
URINE GLUCOSE-RANDOM*: (no result)
URINE LEUKOCYTES-REFLEX: NEGATIVE
URINE NITRITE-REFLEX: NEGATIVE
URINE PROTEIN (DIPSTICK): NEGATIVE
UROBILINOGEN UR STRIP-ACNC: 0.2 E.U./DL (ref 0.2–1)
WBC # BLD AUTO: 12.6 THOU/UL (ref 4–11)

## 2021-04-16 NOTE — NUR
chart review. discussed during los and am rounds. dc saturday 4/17/21, home no
needs. unable to visit with him rt up in recliner chair with visitor and
curtain pulled in room. no anticipated needs. live home with samir wife.
independent.
dcp: home no needs.

## 2021-04-16 NOTE — NUR
ALERT AND ORIENTED AND DENIES PAIN. ON AYSHA GTT FOR BP SUPPORT AND TITRATING AS
TOLERATED. UP TO THE CHAIR WITH MINIMAL ASSIST. TOLERATING DIET W/O NAUSEA.
MERIDA DC'D AND URINAL PROVIDED. A-LINE WITH ADEQUATE WAVE FORM. DRESSING ON
RT. NECK WITH DRIED DRAINAGE CHANGED BY MAGALYS THIS MORNING AND GWEN APPLIED.
WILL CONTINUE WITH POC AND TITRATE AYSHA OFF IF BP TOLERATES. SPOUSE AT THE
BEDSIDE AND UPDATED.

## 2021-04-16 NOTE — NUR
Patient complaining of disocomfort in bladder and states that it does not feel
its emptying entirely out of catheter. Flushed catheter, emptied and refilled
balloon, and inspected. Bladder scanned patient 3 times. Max residual was 5
mLs. Will continue to monitor.

## 2021-04-16 NOTE — NUR
PT ARRIVED TO UNIT FROM ICU APPROX 1820 WITH SPOUSE AT BEDSIDE. VSS. DENIES
PAIN. R NECK DRESSING INTACT WITH DRIED DRAINAGE. R GROIN SITE CDI NO
HEMATOMA. PLAN FOR CABG MONDAY. DENIES NEEDS CURRENTLY. CONTINUING POC. REPORT
PASSED TO JAVY GAUTHIER.

## 2021-04-16 NOTE — 2DMMODE
[facility]
[place]   [address]                   2 D/M-MODE ECHOCARDIOGRAM     
_______________________________________________________________________________
 
Name:       EILEEN WALTERS [facility]           Room #:         248-P       ADM IN  
M.R.#:      [id number]                       Account #:      [id number]  
Admission:  04/15/21    Attend Phys:    Liu Savage MD  
Discharge:              Date of Birth:  [birth year]  
                                                          Report #: [id number]
                                                                    [id number]
_______________________________________________________________________________
THIS REPORT FOR:  
 
cc:  Bhanu Hutchinson MD, Neal A. MD Lundgren,Herve LIM MD [facility]                                        
                                                                       ~
 
--------------- APPROVED REPORT --------------
 
 
Study performed:  2021 08:01:01
 
EXAM: Comprehensive 2D, Doppler, and color-flow 
Echocardiogram 
Patient Location: ICU    
Room #:  248     Status:  routine
 
      BSA:         1.90
HR: 52 bpm BP:          110/51 mmHg 
Rhythm: NSR     
 
Other Information 
Study Quality: Good/patient scanned in recliner.
 
Indications
Pre-Op CABG.
Hx: Carotid endart (04/15/21), DM, HTN, HLP.
 
2D Dimensions
RVDd:  35.72 mm   
IVSd:  9.10 (7-11mm)  LVOT Diam:  19.90 (18-24mm) 
LVDd:  46.37 mm   
PWd:  9.06 (7-11mm)  
LVDs:  31.63 (25-40mm)  
Left Atrium:  33.65 (27-40mm) 
Aortic Root:  27.56 mm  
 
Volumes
Left Atrial Volume (Systole) 
Single Plane 4CH:  32.08 mL Single Plane 2CH:  38.99 mL
    LA ESV Index:  20.00 mL/m2
 
Aortic Valve
AoV Peak Miguel.:  1.52 m/s 
AO Peak Gr.:  9.20 mmHg  LVOT Max P.97 mmHg
    LVOT Max V:  0.86 m/s
 
 
[facility]
[address]
[place]  [address]
Phone:  [phone]                    2 D/M-MODE ECHOCARDIOGRAM     
_______________________________________________________________________________
 
Name:            EILEEN WALTERS [facility]           Room #:        248-P       [facility] [place]
[facility].#:           [id number]          Account #:     [id number]  
Admission:       04/15/21         Attend Phys:   Liu Savage, 
Discharge:                  Date of Birth: [birth year]  
                         Report #:      [id number]
        [id number]
_______________________________________________________________________________
JENNY Vmax: 1.76 cm2  
 
Mitral Valve
    E/A Ratio:  1.4
    MV Decel. Time:  186.24 ms
MV E Max Miguel.:  1.21 m/s 
MV A Miguel.:  0.87 m/s  
MV PHT:  54.01 ms  
IVRT:  59.98 ms   
 
Pulmonary Valve
PV Peak Miguel.:  0.91 m/s PV Peak Gr.:  3.33 mmHg
 
Pulmonary Vein
P Vein S:    0.56 m/s P Vein A:  0.28 m/s
P Vein D:   0.47 m/s P Vein A Dur.:  156.9 msec
P Vein S/D Ratio:  1.19 
 
Tricuspid Valve
 RAP Estimate:  5.00 mmHg
 
Left Ventricle
The left ventricle is normal size. There is normal LV segmental wall 
motion. There is normal left ventricular wall thickness. Left 
ventricular systolic function is normal. LVEF is 55%. The left 
ventricular diastolic function is normal.
 
Right Ventricle
The right ventricle is normal size. The right ventricular systolic 
function is normal.
 
Atria
The left atrium size is normal. The right atrium size is 
normal.
 
Aortic Valve
The aortic valve is normal in structure. No aortic regurgitation is 
present. There is no aortic valvular stenosis.
 
Mitral Valve
The mitral valve is normal in structure. Trace mitral regurgitation. 
No evidence of mitral valve stenosis.
 
Tricuspid Valve
The tricuspid valve is normal in structure. There is no tricuspid 
valve regurgitation noted. Unable to assess PA pressure.
 
 
[facility]
[address]
[place]  [address]
Phone:  [phone]                    2 D/M-MODE ECHOCARDIOGRAM     
_______________________________________________________________________________
 
Name:            EILEEN WALTERS [facility]           Room #:        248-P       [facility] [place]
M.R.#:           [id number]          Account #:     [id number]  
Admission:       04/15/21         Attend Phys:   Liu Savage, 
Discharge:                  Date of Birth: [birth year]  
                         Report #:      [id number]
        [id number]
_______________________________________________________________________________
 
Pulmonic Valve
Pulmonic valve is not well visualized. There is no pulmonic valvular 
regurgitation.
 
Great Vessels
The aortic root is normal in size. Ascending aorta is not well 
visualized. IVC is normal in size and collapses >50% with 
inspiration.
 
Pericardium
There is no pericardial effusion.
 
<Conclusion>
Left ventricular systolic function is normal.
There is normal LV segmental wall motion.
LVEF is 55%. Normal diastolic function
The aortic valve is normal in structure. No aortic regurgitation or 
stenosis
The mitral valve is normal in structure. Trace mitral 
regurgitation.
Unable to assess pulmonary artery pressure.
There is no pericardial effusion.
 
 
 
 
 
 
 
 
 
 
 
 
 
 
 
 
 
 
 
 
 
  <ELECTRONICALLY SIGNED>
   By: Herve Alvarado MD, FACC   
  21
D: 21                           _____________________________________
T: 21                           Herve Alvarado MD, FACC     /INF

## 2021-04-17 VITALS — DIASTOLIC BLOOD PRESSURE: 64 MMHG | SYSTOLIC BLOOD PRESSURE: 117 MMHG

## 2021-04-17 VITALS — DIASTOLIC BLOOD PRESSURE: 62 MMHG | SYSTOLIC BLOOD PRESSURE: 127 MMHG

## 2021-04-17 VITALS — DIASTOLIC BLOOD PRESSURE: 61 MMHG | SYSTOLIC BLOOD PRESSURE: 113 MMHG

## 2021-04-17 VITALS — DIASTOLIC BLOOD PRESSURE: 63 MMHG | SYSTOLIC BLOOD PRESSURE: 117 MMHG

## 2021-04-17 VITALS — SYSTOLIC BLOOD PRESSURE: 134 MMHG | DIASTOLIC BLOOD PRESSURE: 55 MMHG

## 2021-04-17 LAB
EST. AVERAGE GLUCOSE BLD GHB EST-MCNC: 177 MG/DL
GLYCOHEMOGLOBIN (HGB A1C): 7.8 % (ref 4.8–5.6)

## 2021-04-17 NOTE — NUR
ASSESSMENT:
PT REMAIN ALERT AND ORIENT TIMES FOUR. WIFE WAS AT THE BEDSIDE AT THE
BEGINNING OF THE SHIFT WITH QUESTIONS ABOUT THE POC WITH PT.  ALL QUESTIONS
WERE ANSWERED WITH NO FURTHER CONCERNS.  SB PER MONITOR. VSS, AFEBRILE. UP
WITH SBA TO BR TO WASH FACE/BRUSH TEETH.  RIGHT GROIN SITE C/D/I, NO OOZING
NOR BRUISING.  CABAGE PENDING FOR MONDAY.  C/O SLIGHT HA, TYLENOL GIVEN WITH
PARTIAL RELIEF. RIGHT PECO DRESSING INTACT. WILL CONTINUE TO MONITOR.

## 2021-04-17 NOTE — NUR
PT RESTING IN BED WITH WIFE AT BEDSIDE. PT VOICES RELIEF FROM TAKING A SHOWER.
VSS. WILL CONTINUE TO MONITOR AND FOLLOW POC.

## 2021-04-17 NOTE — NUR
ASSUMED PT CARE AT 0700. PT RESTING IN BED. 0800, ASSESSMENT PERFORMED AS
CHARTED. VSS. PTS PLAN IS TO REST, CABG ON MONDAY. WILL CONTINUE TO MONITOR
AND FOLLOW POC.

## 2021-04-18 VITALS — SYSTOLIC BLOOD PRESSURE: 141 MMHG | DIASTOLIC BLOOD PRESSURE: 68 MMHG

## 2021-04-18 VITALS — SYSTOLIC BLOOD PRESSURE: 144 MMHG | DIASTOLIC BLOOD PRESSURE: 75 MMHG

## 2021-04-18 VITALS — SYSTOLIC BLOOD PRESSURE: 111 MMHG | DIASTOLIC BLOOD PRESSURE: 60 MMHG

## 2021-04-18 VITALS — SYSTOLIC BLOOD PRESSURE: 144 MMHG | DIASTOLIC BLOOD PRESSURE: 53 MMHG

## 2021-04-18 LAB
INR PPP: 1
PROTHROMBIN TIME: 10.9 SECONDS (ref 9.3–11.4)

## 2021-04-18 NOTE — NUR
EARLY IN SHIFT PATIENT COMPLAINED OF CHEST PAIN/TIGHTNESS 4/10 AND ALSO
FEELING ANXIOUS. CALL PLACED TO PROVIDER FOR STAT EKG AND ANTI ANXIETY
MEDICATION. EKG WAS NEGATIVE WITH MEDICATION WORKING TO GOOD AFFECT. PATIENT
IS SCHEDULED FOR CABG EARLY TOMORROW. NURSE TO CONTINUE TO MONITOR.

## 2021-04-18 NOTE — NUR
PT ALERT AND ORIENTED X4. VSS AFEBRILE. DRESSING TO RIGHT NECK AND RIGHT GROIN
REMAIN DRY AND INTACT. PULSES PALPABLE. DENIED PAIN. ANXIOUS ABOUT SURGERY
MONDAY. NO S/S DISTRESS PRESENTLY.

## 2021-04-18 NOTE — NUR
NOTIFIED DR PRETTY OF PT C/OHEST PRESSURE 2/10. CALLED  RESULT OF EKG .
LABS DRAWN ,. HEPARIN GTT STARTED AS ORDERED. PT STATED HIS CHEST PRESSURE WNT
DOWN TO 1/10 AFTER EKG DONE AND FELT LIKE IT WAS ALL IN HIS HEAD.

## 2021-04-18 NOTE — NUR
ANSWERED PHONE CALL FROM DR SURESH. PT WAS STARTED ON A HEPARIN GTT PER DR VILLATORO AND NIGHT SHIFT NURSE JOSE. I, DAY SHIFT RN AND NIGHT SHIFT RN JOSE
STARTED THE GTT PER THE CARDIOLOGY PROTOCOL. DR SURESH WAS ROUNDING THE UNIT
BEFORE THE STARTING THE GTT, ORDER WAS CLARIFIED AND OKAY'D BY DR SURESH TO
PROCEDE. AT 0815, DR SURESH CALLS AND STATES HE WOULD NOT LIKE THE BOLUS. DR SURESH INFORMED THE BOLUS WAS ALREADY GIVEN PER PROTOCOL AFTER CLARIFYING THE
ORDER WITH HIM IN PERSON.

## 2021-04-19 VITALS — SYSTOLIC BLOOD PRESSURE: 106 MMHG | DIASTOLIC BLOOD PRESSURE: 54 MMHG

## 2021-04-19 VITALS — SYSTOLIC BLOOD PRESSURE: 92 MMHG | DIASTOLIC BLOOD PRESSURE: 46 MMHG

## 2021-04-19 VITALS — SYSTOLIC BLOOD PRESSURE: 120 MMHG | DIASTOLIC BLOOD PRESSURE: 66 MMHG

## 2021-04-19 VITALS — DIASTOLIC BLOOD PRESSURE: 57 MMHG | SYSTOLIC BLOOD PRESSURE: 112 MMHG

## 2021-04-19 VITALS — DIASTOLIC BLOOD PRESSURE: 66 MMHG | SYSTOLIC BLOOD PRESSURE: 120 MMHG

## 2021-04-19 VITALS — SYSTOLIC BLOOD PRESSURE: 137 MMHG | DIASTOLIC BLOOD PRESSURE: 54 MMHG

## 2021-04-19 VITALS — DIASTOLIC BLOOD PRESSURE: 57 MMHG | SYSTOLIC BLOOD PRESSURE: 101 MMHG

## 2021-04-19 VITALS — SYSTOLIC BLOOD PRESSURE: 146 MMHG | DIASTOLIC BLOOD PRESSURE: 71 MMHG

## 2021-04-19 VITALS — DIASTOLIC BLOOD PRESSURE: 59 MMHG | SYSTOLIC BLOOD PRESSURE: 110 MMHG

## 2021-04-19 VITALS — SYSTOLIC BLOOD PRESSURE: 103 MMHG | DIASTOLIC BLOOD PRESSURE: 54 MMHG

## 2021-04-19 VITALS — DIASTOLIC BLOOD PRESSURE: 50 MMHG | SYSTOLIC BLOOD PRESSURE: 110 MMHG

## 2021-04-19 VITALS — SYSTOLIC BLOOD PRESSURE: 114 MMHG | DIASTOLIC BLOOD PRESSURE: 59 MMHG

## 2021-04-19 VITALS — DIASTOLIC BLOOD PRESSURE: 52 MMHG | SYSTOLIC BLOOD PRESSURE: 96 MMHG

## 2021-04-19 VITALS — DIASTOLIC BLOOD PRESSURE: 68 MMHG | SYSTOLIC BLOOD PRESSURE: 126 MMHG

## 2021-04-19 VITALS — SYSTOLIC BLOOD PRESSURE: 100 MMHG | DIASTOLIC BLOOD PRESSURE: 55 MMHG

## 2021-04-19 VITALS — SYSTOLIC BLOOD PRESSURE: 115 MMHG | DIASTOLIC BLOOD PRESSURE: 59 MMHG

## 2021-04-19 VITALS — DIASTOLIC BLOOD PRESSURE: 60 MMHG | SYSTOLIC BLOOD PRESSURE: 112 MMHG

## 2021-04-19 VITALS — DIASTOLIC BLOOD PRESSURE: 54 MMHG | SYSTOLIC BLOOD PRESSURE: 113 MMHG

## 2021-04-19 VITALS — SYSTOLIC BLOOD PRESSURE: 89 MMHG | DIASTOLIC BLOOD PRESSURE: 45 MMHG

## 2021-04-19 VITALS — SYSTOLIC BLOOD PRESSURE: 125 MMHG | DIASTOLIC BLOOD PRESSURE: 63 MMHG

## 2021-04-19 VITALS — DIASTOLIC BLOOD PRESSURE: 51 MMHG | SYSTOLIC BLOOD PRESSURE: 106 MMHG

## 2021-04-19 VITALS — DIASTOLIC BLOOD PRESSURE: 55 MMHG | SYSTOLIC BLOOD PRESSURE: 117 MMHG

## 2021-04-19 VITALS — SYSTOLIC BLOOD PRESSURE: 115 MMHG | DIASTOLIC BLOOD PRESSURE: 63 MMHG

## 2021-04-19 VITALS — SYSTOLIC BLOOD PRESSURE: 114 MMHG | DIASTOLIC BLOOD PRESSURE: 60 MMHG

## 2021-04-19 VITALS — DIASTOLIC BLOOD PRESSURE: 54 MMHG | SYSTOLIC BLOOD PRESSURE: 98 MMHG

## 2021-04-19 VITALS — DIASTOLIC BLOOD PRESSURE: 62 MMHG | SYSTOLIC BLOOD PRESSURE: 111 MMHG

## 2021-04-19 VITALS — DIASTOLIC BLOOD PRESSURE: 58 MMHG | SYSTOLIC BLOOD PRESSURE: 103 MMHG

## 2021-04-19 VITALS — SYSTOLIC BLOOD PRESSURE: 105 MMHG | DIASTOLIC BLOOD PRESSURE: 56 MMHG

## 2021-04-19 VITALS — DIASTOLIC BLOOD PRESSURE: 66 MMHG | SYSTOLIC BLOOD PRESSURE: 121 MMHG

## 2021-04-19 VITALS — SYSTOLIC BLOOD PRESSURE: 120 MMHG | DIASTOLIC BLOOD PRESSURE: 68 MMHG

## 2021-04-19 VITALS — DIASTOLIC BLOOD PRESSURE: 56 MMHG | SYSTOLIC BLOOD PRESSURE: 105 MMHG

## 2021-04-19 LAB
ALBUMIN SERPL-MCNC: 3.1 G/DL (ref 3.4–5)
ALT SERPL-CCNC: 38 U/L (ref 16–63)
ANION GAP SERPL CALC-SCNC: 8 MMOL/L (ref 7–16)
ANION GAP SERPL CALC-SCNC: 9 MMOL/L (ref 7–16)
APTT BLD: 24.5 SECONDS (ref 24.5–32.8)
APTT BLD: 28.6 SECONDS (ref 24.5–32.8)
AST SERPL-CCNC: 21 U/L (ref 15–37)
BASE EXCESS STD BLDA CALC-SCNC: -3 MMOL/L
BASE EXCESS STD BLDA CALC-SCNC: 1 MMOL/L
BE(VIVO): -3.3 MMOL/L
BE(VIVO): -3.4 MMOL/L
BE(VIVO): -3.7 MMOL/L
BE(VIVO): -3.9 MMOL/L
BILIRUB SERPL-MCNC: 0.6 MG/DL (ref 0.2–1)
BUN SERPL-MCNC: 11 MG/DL (ref 7–18)
BUN SERPL-MCNC: 12 MG/DL (ref 7–18)
CALCIUM SERPL-MCNC: 7.7 MG/DL (ref 8.5–10.1)
CALCIUM SERPL-MCNC: 9.1 MG/DL (ref 8.5–10.1)
CHLORIDE SERPL-SCNC: 108 MMOL/L (ref 98–107)
CHLORIDE SERPL-SCNC: 116 MMOL/L (ref 98–107)
CO2 SERPL-SCNC: 24 MMOL/L (ref 21–32)
CO2 SERPL-SCNC: 28 MMOL/L (ref 21–32)
CREAT SERPL-MCNC: 1 MG/DL (ref 0.7–1.3)
CREAT SERPL-MCNC: 1.1 MG/DL (ref 0.7–1.3)
ERYTHROCYTE [DISTWIDTH] IN BLOOD BY AUTOMATED COUNT: 13 % (ref 10.5–14.5)
ERYTHROCYTE [DISTWIDTH] IN BLOOD BY AUTOMATED COUNT: 13.1 % (ref 10.5–14.5)
ERYTHROCYTE [DISTWIDTH] IN BLOOD BY AUTOMATED COUNT: 13.2 % (ref 10.5–14.5)
FIBRINOGEN PPP-MCNC: 292 MG/DL (ref 210–360)
GLUCOSE BLD-MCNC: 141 MG/DL (ref 70–99)
GLUCOSE BLD-MCNC: 168 MG/DL (ref 70–99)
GLUCOSE SERPL-MCNC: 152 MG/DL (ref 74–106)
GLUCOSE SERPL-MCNC: 160 MG/DL (ref 74–106)
HCO3 BLD-SCNC: 21.1 MMOL/L (ref 22–26)
HCO3 BLD-SCNC: 21.4 MMOL/L (ref 22–26)
HCO3 BLD-SCNC: 21.8 MMOL/L (ref 22–26)
HCO3 BLD-SCNC: 22 MMOL/L (ref 22–26)
HCO3 BLDA-SCNC: 23.1 MMOL/L (ref 22–26)
HCO3 BLDA-SCNC: 25 MMOL/L (ref 22–26)
HCT VFR BLD AUTO: 31 % (ref 42–52)
HCT VFR BLD AUTO: 32 % (ref 42–52)
HCT VFR BLD CALC: 26.7 % (ref 42–52)
HCT VFR BLD CALC: 27 % (ref 42–52)
HCT VFR BLD CALC: 37.6 % (ref 42–52)
HGB BLD-MCNC: 10.5 G/DL (ref 14–18)
HGB BLD-MCNC: 10.9 G/DL (ref 14–18)
HGB BLD-MCNC: 13.1 GM/DL (ref 14–18)
HGB BLD-MCNC: 8.9 GM/DL (ref 14–18)
HGB BLD-MCNC: 9.3 GM/DL (ref 14–18)
INR PPP: 1.38
INR PPP: 1.39
MAGNESIUM SERPL-MCNC: 2.1 MG/DL (ref 1.8–2.4)
MCH RBC QN AUTO: 33.6 PG (ref 26–34)
MCH RBC QN AUTO: 34.7 PG (ref 26–34)
MCH RBC QN AUTO: 34.7 PG (ref 26–34)
MCHC RBC AUTO-ENTMCNC: 33.2 G/DL (ref 28–37)
MCHC RBC AUTO-ENTMCNC: 34.3 G/DL (ref 28–37)
MCHC RBC AUTO-ENTMCNC: 34.7 G/DL (ref 28–37)
MCV RBC: 100 FL (ref 80–100)
MCV RBC: 101.3 FL (ref 80–100)
MCV RBC: 101.3 FL (ref 80–100)
PCO2 BLD: 37.4 MMHG (ref 35–45)
PCO2 BLD: 37.9 MMHG (ref 35–45)
PCO2 BLD: 40.3 MMHG (ref 35–45)
PCO2 BLD: 41.3 MMHG (ref 35–45)
PCO2 BLDA: 34.5 MMHG (ref 35–45)
PCO2 BLDA: 42.9 MMHG (ref 35–45)
PH BLDA: 7.34 [PH] (ref 7.36–7.45)
PH BLDA: 7.47 [PH] (ref 7.36–7.45)
PLATELET # BLD: 132 THOU/UL (ref 150–400)
PLATELET # BLD: 146 THOU/UL (ref 150–400)
PLATELET # BLD: 87 THOU/UL (ref 150–400)
PO2 BLD: 118.9 MMHG (ref 80–100)
PO2 BLD: 133.3 MMHG (ref 80–100)
PO2 BLD: 149.5 MMHG (ref 80–100)
PO2 BLD: 161.3 MMHG (ref 80–100)
POC CA IONIZED: 4.6 MG/DL (ref 4.5–5.3)
POC CA IONIZED: 5 MG/DL (ref 4.5–5.3)
POTASSIUM SERPL-SCNC: 3.4 MMOL/L (ref 3.5–5.1)
POTASSIUM SERPL-SCNC: 3.6 MMOL/L (ref 3.5–5.1)
POTASSIUM SERPL-SCNC: 3.8 MMOL/L (ref 3.5–5.1)
POTASSIUM SERPL-SCNC: 3.9 MMOL/L (ref 3.5–5.1)
PROT SERPL-MCNC: 6.6 G/DL (ref 6.4–8.2)
PROTHROMBIN TIME: 14.8 SECONDS (ref 9.3–11.4)
PROTHROMBIN TIME: 14.9 SECONDS (ref 9.3–11.4)
RBC # BLD AUTO: 2.63 MIL/UL (ref 4.5–6)
RBC # BLD AUTO: 2.67 MIL/UL (ref 4.5–6)
RBC # BLD AUTO: 3.76 MIL/UL (ref 4.5–6)
SODIUM SERPL-SCNC: 140 MMOL/L (ref 136–145)
SODIUM SERPL-SCNC: 143 MMOL/L (ref 136–145)
SODIUM SERPL-SCNC: 144 MMOL/L (ref 136–145)
SODIUM SERPL-SCNC: 149 MMOL/L (ref 136–145)
WBC # BLD AUTO: 11.5 THOU/UL (ref 4–11)
WBC # BLD AUTO: 12.8 THOU/UL (ref 4–11)
WBC # BLD AUTO: 8.3 THOU/UL (ref 4–11)

## 2021-04-19 PROCEDURE — 5A1221Z PERFORMANCE OF CARDIAC OUTPUT, CONTINUOUS: ICD-10-PCS

## 2021-04-19 PROCEDURE — 021209W BYPASS CORONARY ARTERY, THREE ARTERIES FROM AORTA WITH AUTOLOGOUS VENOUS TISSUE, OPEN APPROACH: ICD-10-PCS | Performed by: RADIOLOGY

## 2021-04-19 PROCEDURE — 05HY33Z INSERTION OF INFUSION DEVICE INTO UPPER VEIN, PERCUTANEOUS APPROACH: ICD-10-PCS

## 2021-04-19 PROCEDURE — 06BQ4ZZ EXCISION OF LEFT SAPHENOUS VEIN, PERCUTANEOUS ENDOSCOPIC APPROACH: ICD-10-PCS

## 2021-04-19 PROCEDURE — 02100Z9 BYPASS CORONARY ARTERY, ONE ARTERY FROM LEFT INTERNAL MAMMARY, OPEN APPROACH: ICD-10-PCS | Performed by: THORACIC SURGERY (CARDIOTHORACIC VASCULAR SURGERY)

## 2021-04-19 NOTE — PATH
[facility]
[address] [place]   [address]                     PATHOLOGY RPT PROCEDURE       
_______________________________________________________________________________
 
Name:       LOW WALTERSIO [facility]           Room #:         248-P       ADM IN  
M.R.#:      [id number]     Account #:      [id number]  
Admission:  04/15/21    Date of Birth:  [birth year]  
Discharge:                                              Report #:    [id number]
                                                        Path Case #: [id number]
_______________________________________________________________________________
 
LCA Accession Number: [id number]
.                                                                01
Material submitted:                                        .
carotid body - RIGHT CAROTID ARTERY PLAQUE. Modifiers: right
.                                                                01
Clinical history:                                          .
CAROTID ENDARTERECTOMY, RIGHT CAROTID ARTERY STENOSIS
.                                                                02
**********************************************************************
Diagnosis:
Right carotid artery plaque, removal:
- Fragments of vessel wall with myxoid degeneration as well as
atherosclerotic plaque.
(IUV:malaika; 04/19/2021)
QMS  04/19/2021  1629 Local
**********************************************************************
.                                                                02
Electronically signed:                                     .
Mariam Villarreal MD, Pathologist
NPI- [id number]
.                                                                01
Gross description:                                         .
Received in formalin labeled "Jerald Walters, right carotid endarterectomy"
is a tan-white cylindrical portion of soft tissue measuring 2.9 cm in
length by 0.7 cm in diameter.  The specimen is serially sectioned to
reveal a maximum stenosis of 80% with scattered calcifications.
Representative sections of the specimen are submitted without
decalcification in cassette A1.  ([facility]; 4/17/2021)
GZA/GZA  04/19/2021  16[facility]
.                                                                02
Pathologist provided ICD-10:
I65.21
.                                                                02
CPT                                                        .
482418
Specimen Comment: A courtesy copy of this report has been sent to [phone]
[phone]
Specimen Comment: [phone], [phone]
Specimen Comment: Report sent to ,DR ARREAGA / DR MCPHERSON
***Performed at:  01
   [address], [place]  [id number]
   MD Cullen Yates MD Phone:  [phone]
***Performed at:  02
   [address]  [id number]
   MD Mariam Villarreal MD Phone:  [phone]

## 2021-04-19 NOTE — EKG
[place] [address] [facility] [address]
[place]  [address]
Phone:  [phone]                    ELECTROCARDIOGRAM REPORT      
_______________________________________________________________________________
 
Name:       EILEEN WALTERS [facility]           Room #:         150-4       ADM IN  
M.R.#:      [id number]     Account #:      [id number]  
Admission:  04/15/21    Attend Phys:    Liu Savage MD  
Discharge:              Date of Birth:  [birth year]  
                                                          Report #: [id number]
   [id number]
_______________________________________________________________________________
                          [facility]
                                       
Test Date:    2021               Test Time:    06:09:19
Pat Name:     EILEEN WALTERS             Department:   
Patient ID:   [id number]            Room:         [facility]
Gender:       M                        Technician:   [id number]
:          1950               Requested By: Vamsi Kessler
Order Number: [id number] MD:   Herve Alvarado
                                 Measurements
Intervals                              Axis          
Rate:         58                       P:            85
NH:           175                      QRS:          57
QRSD:         101                      T:            11
QT:           404                                    
QTc:          397                                    
                           Interpretive Statements
Sinus bradycardia
Otherwise normal tracing
Baseline wander in lead(s) V4
Compared to ECG 2021 07:16:22
No significant change was found
Electronically Signed On 2021 9:36:19 CDT by Herve Alvarado
[email or web address]
 
 
 
 
 
 
 
 
 
 
 
 
 
 
 
 
 
 
 
 
  <ELECTRONICALLY SIGNED>
   By: Herve Alvarado MD, [facility]   
  21     0936
D: 21                           _____________________________________
T: 21                           Herve Alvarado MD, [facility]     /EPI

## 2021-04-19 NOTE — NUR
chart review. cm consult dcp. unable to visit with pt rt surgery today, cabg x
4. will cont following as needed for dc needs.

## 2021-04-19 NOTE — NUR
Nutrition: pt S/P CABG x 4 today. RD will followup to address education needs
when out of ICU and closer to D/C.

## 2021-04-19 NOTE — EKG
[address] [place]  [address]
Phone:  [phone]                    ELECTROCARDIOGRAM REPORT      
_______________________________________________________________________________
 
Name:       EILEEN WALTERS [facility]           Room #:         150-4       ADM IN  
M.R.#:      [id number]     Account #:      [id number]  
Admission:  04/15/21    Attend Phys:    Liu Savage MD  
Discharge:              Date of Birth:  [birth year]  
                                                          Report #: [id number]
   [id number]
_______________________________________________________________________________
                          [facility]
                                       
Test Date:    2021               Test Time:    07:16:22
Pat Name:     EILEEN WALTERS             Department:   
Patient ID:   [id number]            Room:         150
Gender:       M                        Technician:   ANNETTE
:          1950               Requested By: Gustavo Lowry
Order Number: [id number] MD:   Herve Alvarado
                                 Measurements
Intervals                              Axis          
Rate:         59                       P:            62
LA:           170                      QRS:          39
QRSD:         97                       T:            9
QT:           399                                    
QTc:          396                                    
                           Interpretive Statements
Sinus bradycardia
Otherwise normal tracing
Compared to ECG 2021 15:46:38
No significant changes
Electronically Signed On 2021 9:18:11 CDT by Herve Alvarado
[email or web address]
 
 
 
 
 
 
 
 
 
 
 
 
 
 
 
 
 
 
 
 
 
  <ELECTRONICALLY SIGNED>
   By: Herve Alvarado MD, [facility]   
  2118
D: 21                           _____________________________________
T: 21                           Herve Alvarado MD, FACC     /EPI

## 2021-04-20 VITALS — SYSTOLIC BLOOD PRESSURE: 104 MMHG | DIASTOLIC BLOOD PRESSURE: 44 MMHG

## 2021-04-20 VITALS — SYSTOLIC BLOOD PRESSURE: 100 MMHG | DIASTOLIC BLOOD PRESSURE: 54 MMHG

## 2021-04-20 VITALS — DIASTOLIC BLOOD PRESSURE: 53 MMHG | SYSTOLIC BLOOD PRESSURE: 117 MMHG

## 2021-04-20 VITALS — SYSTOLIC BLOOD PRESSURE: 108 MMHG | DIASTOLIC BLOOD PRESSURE: 40 MMHG

## 2021-04-20 VITALS — DIASTOLIC BLOOD PRESSURE: 45 MMHG | SYSTOLIC BLOOD PRESSURE: 112 MMHG

## 2021-04-20 VITALS — SYSTOLIC BLOOD PRESSURE: 96 MMHG | DIASTOLIC BLOOD PRESSURE: 50 MMHG

## 2021-04-20 VITALS — SYSTOLIC BLOOD PRESSURE: 120 MMHG | DIASTOLIC BLOOD PRESSURE: 60 MMHG

## 2021-04-20 VITALS — SYSTOLIC BLOOD PRESSURE: 92 MMHG | DIASTOLIC BLOOD PRESSURE: 51 MMHG

## 2021-04-20 VITALS — DIASTOLIC BLOOD PRESSURE: 48 MMHG | SYSTOLIC BLOOD PRESSURE: 104 MMHG

## 2021-04-20 VITALS — SYSTOLIC BLOOD PRESSURE: 106 MMHG | DIASTOLIC BLOOD PRESSURE: 45 MMHG

## 2021-04-20 VITALS — DIASTOLIC BLOOD PRESSURE: 54 MMHG | SYSTOLIC BLOOD PRESSURE: 101 MMHG

## 2021-04-20 LAB
ANION GAP SERPL CALC-SCNC: 11 MMOL/L (ref 7–16)
BUN SERPL-MCNC: 13 MG/DL (ref 7–18)
CALCIUM SERPL-MCNC: 7.4 MG/DL (ref 8.5–10.1)
CHLORIDE SERPL-SCNC: 109 MMOL/L (ref 98–107)
CO2 SERPL-SCNC: 21 MMOL/L (ref 21–32)
CREAT SERPL-MCNC: 1.1 MG/DL (ref 0.7–1.3)
ERYTHROCYTE [DISTWIDTH] IN BLOOD BY AUTOMATED COUNT: 13 % (ref 10.5–14.5)
GLUCOSE SERPL-MCNC: 156 MG/DL (ref 74–106)
HCT VFR BLD CALC: 22.6 % (ref 42–52)
HGB BLD-MCNC: 7.7 GM/DL (ref 14–18)
MAGNESIUM SERPL-MCNC: 1.8 MG/DL (ref 1.8–2.4)
MCH RBC QN AUTO: 34.7 PG (ref 26–34)
MCHC RBC AUTO-ENTMCNC: 34.3 G/DL (ref 28–37)
MCV RBC: 101 FL (ref 80–100)
PLATELET # BLD: 114 THOU/UL (ref 150–400)
POTASSIUM SERPL-SCNC: 3.9 MMOL/L (ref 3.5–5.1)
RBC # BLD AUTO: 2.23 MIL/UL (ref 4.5–6)
SODIUM SERPL-SCNC: 141 MMOL/L (ref 136–145)
WBC # BLD AUTO: 6.9 THOU/UL (ref 4–11)

## 2021-04-20 NOTE — NUR
MADELINE RAMSEY & VIDA IN THIS AM ~0730.PT BACK UP TO CHAIR ~2HR THIS pNOON.
BACK TO BED W MIN ASSIST X1. PT SLOWLY PROGRESSING TOWARD GOALS.PAIN BETTER
CONTROLLED W ORAL PAIN MEDS. HAS BEEN ON RA MOST OF pNOON BUT DRIFTS W SLEEP/p
PAIN MED SO 1-2L/NC NEEDED.APPETITE IMPROVING. SPOKE W  RE:BG & SS
COVERAGE. WILL HAVE  ADDRESS IN AM. WIFE AT BEDSIDE MOST OF LATE
pNOON.SON IN BRIEFLY.CARE TURNED OVER TO ONCOMING RN.--VW

## 2021-04-20 NOTE — EKG
[place] [address] [facility] [employer]
[place]  [address]
Phone:  [phone]                    ELECTROCARDIOGRAM REPORT      
_______________________________________________________________________________
 
Name:       EILEEN WALTERS [facility]           Room #:         248-P       ADM IN  
M.R.#:      [id number]     Account #:      [id number]  
Admission:  04/15/21    Attend Phys:    Liu Savage MD  
Discharge:              Date of Birth:  [birth year]  
                                                          Report #: [id number]
   [id number]
_______________________________________________________________________________
                          [facility]
                                       
Test Date:    2021               Test Time:    07:36:23
Pat Name:     EILEEN WALTERS             Department:   
Patient ID:   [id number]            Room:         248 P
Gender:       M                        Technician:   SADAF
:          1950               Requested By: Gustavo Lowry
Order Number: [id number] MD:   Herve Alvarado
                                 Measurements
Intervals                              Axis          
Rate:         80                       P:            93
MO:           161                      QRS:          15
QRSD:         95                       T:            -8
QT:           397                                    
QTc:          458                                    
                           Interpretive Statements
Atrial-sensed ventricular-paced rhythm
No further analysis attempted due to paced rhythm
Compared to ECG 2021 15:44:07
Failure to sense is no longer present
Electronically Signed On 2021 9:41:23 CDT by Herve Alvarado
[email or web address]
 
 
 
 
 
 
 
 
 
 
 
 
 
 
 
 
 
 
 
 
 
  <ELECTRONICALLY SIGNED>
   By: Herve Alvarado MD, [facility]   
  0441
D: 2136                           _____________________________________
T: 21                           Herve Alvarado MD, [facility]     /EPI

## 2021-04-20 NOTE — NUR
0830-ASSUMED CARE OF PT EARLIER. PT IN CHAIR.SOME PAIN CONTROL ISSUES.WIFE AT
BEDSIDE-UPDATED ON POC,GOALS FOR THE DAY.--VW
1000-BACK TO BED W MINIMAL ASSIST X2.MIKI UP VERY WELL. VERY STRONG,FOLLOWS &
LISTENS TO DIRECTIONS WELL. PULLING UP TO 1250 ON I.S. FAIR COUGH. ENC
C,DB.--VW
1330-MST'S PULLED W/O PROBLEM. PT MIKI WELL. VENTRIC WIRE CAPPED. WIFE AT
BEDSIDE. PT STATED HE DOES NOT WANT ANY OTHER VISITORS TODAY.--VW
1415-[facility] SHOWED UP, PT STATED HE WOULD SEE FOR A FEW MINUTES ONLY. MED FOR
PAIN AS NEEDED.--VW

## 2021-04-20 NOTE — EKG
[place] [address] [facility] [place]  [address]
Phone:  [phone]                    ELECTROCARDIOGRAM REPORT      
_______________________________________________________________________________
 
Name:       EILEEN WALTERS [facility]           Room #:         248-P       ADM IN  
M.R.#:      [id number]     Account #:      [id number]  
Admission:  04/15/21    Attend Phys:    Liu Savage MD  
Discharge:              Date of Birth:  [birth year]  
                                                          Report #: [id number]
   [id number]
_______________________________________________________________________________
                          [facility]
                                       
Test Date:    2021               Test Time:    20:50:55
Pat Name:     EILEEN WALTERS             Department:   
Patient ID:   [id number]            Room:         248
Gender:       M                        Technician:   CHRIST GAUTHIER
:          1950               Requested By: Liu Savage
Order Number: [id number] MD:   Herve Alvarado
                                 Measurements
Intervals                              Axis          
Rate:         59                       P:            56
VT:           170                      QRS:          38
QRSD:         96                       T:            2
QT:           411                                    
QTc:          408                                    
                           Interpretive Statements
Sinus bradycardia
Baseline wander in lead(s) V2
Compared to ECG 2021 06:09:19
Sinus bradycardia no longer present
Electronically Signed On 2021 9:24:33 CDT by Herve Alvarado
[email or web address]
 
 
 
 
 
 
 
 
 
 
 
 
 
 
 
 
 
 
 
 
 
  <ELECTRONICALLY SIGNED>
   By: Herve Alvarado MD, [facility]   
  21
D: 21                           _____________________________________
T: 21                           Herve Alvarado MD, FACC     /EPI

## 2021-04-20 NOTE — NUR
This RN spoke to Sachi, patients wife at 2200 regarding patient status and
care. Updated on status and care plan.

## 2021-04-20 NOTE — EKG
[place] [address] [facility] [address]
[place]  [address]
Phone:  [phone]                    ELECTROCARDIOGRAM REPORT      
_______________________________________________________________________________
 
Name:       EILEEN WALTERS [facility]           Room #:         248-P       ADM IN  
M.R.#:      [id number]     Account #:      [id number]  
Admission:  04/15/21    Attend Phys:    Liu Savage MD  
Discharge:              Date of Birth:  [birth year]  
                                                          Report #: [id number]
   [id number]
_______________________________________________________________________________
                          [facility]
                                       
Test Date:    2021               Test Time:    15:44:07
Pat Name:     EILENE WALTERS             Department:   
Patient ID:   [id number]            Room:         248
Gender:       M                        Technician:   FSCHWALBE
:          1950               Requested By: Gustavo Lowry
Order Number: [id number] MD:   Herve Alvarado
                                 Measurements
Intervals                              Axis          
Rate:         93                       P:            107
ND:           183                      QRS:          42
QRSD:         96                       T:            -10
QT:           385                                    
QTc:          479                                    
                           Interpretive Statements
Sinus rhythm
Abnormal R-wave progression, early transition
Borderline T abnormalities, inferior leads
Borderline prolonged QT interval
Compared to ECG 2021 20:50:55
QT interval has lengthened
Electronically Signed On 2021 9:33:21 CDT by Herve Alvarado
[email or web address]
 
 
 
 
 
 
 
 
 
 
 
 
 
 
 
 
 
 
 
  <ELECTRONICALLY SIGNED>
   By: Herve Alvarado MD, [facility]   
  21     0933
D: 21 1544                           _____________________________________
T: 21 1544                           Herve Alvarado MD, [facility]     /EPI

## 2021-04-20 NOTE — NUR
Pt progressing toward goals.  Hemodynamics within parameters; pt received a
total of 3 bottles of 5% albumin over this shift for low SBP. Pt titrated from
4 L O2 per nasal canula down to 2 L, sat remains > 95%.  Breath sounds remain
clear.  Taking clear liquids without nausea or vomitting.  Urine output 630 cc
for this shift.  Mediastinal CT x2 and Left plural CT x1 remain to -20 cmH2O,
no air leak or crepitus.  Serosanguinous drainage from both tubes -- 140 cc
from Mediastinals and 230 cc from plural.  Pain adequately controlled with
Fentanyl 25-50 mcg IVP q 2 hours.  Pt up to chair at 0515 - tolerating
activity well.

## 2021-04-21 VITALS — DIASTOLIC BLOOD PRESSURE: 29 MMHG | SYSTOLIC BLOOD PRESSURE: 109 MMHG

## 2021-04-21 VITALS — SYSTOLIC BLOOD PRESSURE: 82 MMHG | DIASTOLIC BLOOD PRESSURE: 42 MMHG

## 2021-04-21 VITALS — SYSTOLIC BLOOD PRESSURE: 108 MMHG | DIASTOLIC BLOOD PRESSURE: 37 MMHG

## 2021-04-21 VITALS — DIASTOLIC BLOOD PRESSURE: 46 MMHG | SYSTOLIC BLOOD PRESSURE: 117 MMHG

## 2021-04-21 VITALS — DIASTOLIC BLOOD PRESSURE: 46 MMHG | SYSTOLIC BLOOD PRESSURE: 103 MMHG

## 2021-04-21 VITALS — SYSTOLIC BLOOD PRESSURE: 101 MMHG | DIASTOLIC BLOOD PRESSURE: 45 MMHG

## 2021-04-21 VITALS — SYSTOLIC BLOOD PRESSURE: 106 MMHG | DIASTOLIC BLOOD PRESSURE: 42 MMHG

## 2021-04-21 VITALS — DIASTOLIC BLOOD PRESSURE: 45 MMHG | SYSTOLIC BLOOD PRESSURE: 102 MMHG

## 2021-04-21 VITALS — DIASTOLIC BLOOD PRESSURE: 39 MMHG | SYSTOLIC BLOOD PRESSURE: 104 MMHG

## 2021-04-21 VITALS — DIASTOLIC BLOOD PRESSURE: 40 MMHG | SYSTOLIC BLOOD PRESSURE: 119 MMHG

## 2021-04-21 VITALS — DIASTOLIC BLOOD PRESSURE: 42 MMHG | SYSTOLIC BLOOD PRESSURE: 103 MMHG

## 2021-04-21 VITALS — SYSTOLIC BLOOD PRESSURE: 107 MMHG | DIASTOLIC BLOOD PRESSURE: 32 MMHG

## 2021-04-21 VITALS — DIASTOLIC BLOOD PRESSURE: 49 MMHG | SYSTOLIC BLOOD PRESSURE: 112 MMHG

## 2021-04-21 VITALS — SYSTOLIC BLOOD PRESSURE: 99 MMHG | DIASTOLIC BLOOD PRESSURE: 52 MMHG

## 2021-04-21 VITALS — DIASTOLIC BLOOD PRESSURE: 41 MMHG | SYSTOLIC BLOOD PRESSURE: 115 MMHG

## 2021-04-21 VITALS — SYSTOLIC BLOOD PRESSURE: 117 MMHG | DIASTOLIC BLOOD PRESSURE: 45 MMHG

## 2021-04-21 VITALS — SYSTOLIC BLOOD PRESSURE: 109 MMHG | DIASTOLIC BLOOD PRESSURE: 45 MMHG

## 2021-04-21 VITALS — DIASTOLIC BLOOD PRESSURE: 53 MMHG | SYSTOLIC BLOOD PRESSURE: 112 MMHG

## 2021-04-21 VITALS — DIASTOLIC BLOOD PRESSURE: 55 MMHG | SYSTOLIC BLOOD PRESSURE: 113 MMHG

## 2021-04-21 VITALS — SYSTOLIC BLOOD PRESSURE: 110 MMHG | DIASTOLIC BLOOD PRESSURE: 50 MMHG

## 2021-04-21 VITALS — SYSTOLIC BLOOD PRESSURE: 107 MMHG | DIASTOLIC BLOOD PRESSURE: 41 MMHG

## 2021-04-21 VITALS — SYSTOLIC BLOOD PRESSURE: 112 MMHG | DIASTOLIC BLOOD PRESSURE: 47 MMHG

## 2021-04-21 VITALS — DIASTOLIC BLOOD PRESSURE: 51 MMHG | SYSTOLIC BLOOD PRESSURE: 113 MMHG

## 2021-04-21 VITALS — DIASTOLIC BLOOD PRESSURE: 56 MMHG | SYSTOLIC BLOOD PRESSURE: 107 MMHG

## 2021-04-21 VITALS — DIASTOLIC BLOOD PRESSURE: 51 MMHG | SYSTOLIC BLOOD PRESSURE: 115 MMHG

## 2021-04-21 VITALS — DIASTOLIC BLOOD PRESSURE: 47 MMHG | SYSTOLIC BLOOD PRESSURE: 104 MMHG

## 2021-04-21 VITALS — SYSTOLIC BLOOD PRESSURE: 108 MMHG | DIASTOLIC BLOOD PRESSURE: 35 MMHG

## 2021-04-21 VITALS — DIASTOLIC BLOOD PRESSURE: 51 MMHG | SYSTOLIC BLOOD PRESSURE: 99 MMHG

## 2021-04-21 VITALS — DIASTOLIC BLOOD PRESSURE: 49 MMHG | SYSTOLIC BLOOD PRESSURE: 124 MMHG

## 2021-04-21 VITALS — DIASTOLIC BLOOD PRESSURE: 37 MMHG | SYSTOLIC BLOOD PRESSURE: 105 MMHG

## 2021-04-21 VITALS — DIASTOLIC BLOOD PRESSURE: 43 MMHG | SYSTOLIC BLOOD PRESSURE: 105 MMHG

## 2021-04-21 VITALS — DIASTOLIC BLOOD PRESSURE: 55 MMHG | SYSTOLIC BLOOD PRESSURE: 129 MMHG

## 2021-04-21 VITALS — DIASTOLIC BLOOD PRESSURE: 41 MMHG | SYSTOLIC BLOOD PRESSURE: 109 MMHG

## 2021-04-21 VITALS — DIASTOLIC BLOOD PRESSURE: 44 MMHG | SYSTOLIC BLOOD PRESSURE: 106 MMHG

## 2021-04-21 VITALS — SYSTOLIC BLOOD PRESSURE: 114 MMHG | DIASTOLIC BLOOD PRESSURE: 46 MMHG

## 2021-04-21 VITALS — SYSTOLIC BLOOD PRESSURE: 94 MMHG | DIASTOLIC BLOOD PRESSURE: 50 MMHG

## 2021-04-21 VITALS — SYSTOLIC BLOOD PRESSURE: 113 MMHG | DIASTOLIC BLOOD PRESSURE: 47 MMHG

## 2021-04-21 VITALS — DIASTOLIC BLOOD PRESSURE: 44 MMHG | SYSTOLIC BLOOD PRESSURE: 118 MMHG

## 2021-04-21 VITALS — SYSTOLIC BLOOD PRESSURE: 107 MMHG | DIASTOLIC BLOOD PRESSURE: 54 MMHG

## 2021-04-21 VITALS — SYSTOLIC BLOOD PRESSURE: 104 MMHG | DIASTOLIC BLOOD PRESSURE: 40 MMHG

## 2021-04-21 LAB
ALBUMIN SERPL-MCNC: 2.5 G/DL (ref 3.4–5)
ALT SERPL-CCNC: 23 U/L (ref 30–65)
ANION GAP SERPL CALC-SCNC: 12 MMOL/L (ref 7–16)
AST SERPL-CCNC: 27 U/L (ref 15–37)
BILIRUB SERPL-MCNC: 0.5 MG/DL (ref 0.2–1)
BUN SERPL-MCNC: 15 MG/DL (ref 7–18)
CALCIUM SERPL-MCNC: 7.1 MG/DL (ref 8.5–10.1)
CHLORIDE SERPL-SCNC: 111 MMOL/L (ref 98–107)
CO2 SERPL-SCNC: 19 MMOL/L (ref 21–32)
CREAT SERPL-MCNC: 1.1 MG/DL (ref 0.7–1.3)
ERYTHROCYTE [DISTWIDTH] IN BLOOD BY AUTOMATED COUNT: 13.4 % (ref 10.5–14.5)
GLUCOSE SERPL-MCNC: 128 MG/DL (ref 74–106)
HCT VFR BLD CALC: 22.3 % (ref 42–52)
HGB BLD-MCNC: 7.5 GM/DL (ref 14–18)
MCH RBC QN AUTO: 34.3 PG (ref 26–34)
MCHC RBC AUTO-ENTMCNC: 33.7 G/DL (ref 28–37)
MCV RBC: 101.7 FL (ref 80–100)
PLATELET # BLD: 110 THOU/UL (ref 150–400)
POTASSIUM SERPL-SCNC: 3.6 MMOL/L (ref 3.5–5.1)
PROT SERPL-MCNC: 4.8 G/DL (ref 6.4–8.2)
RBC # BLD AUTO: 2.19 MIL/UL (ref 4.5–6)
SODIUM SERPL-SCNC: 142 MMOL/L (ref 136–145)
WBC # BLD AUTO: 8.9 THOU/UL (ref 4–11)

## 2021-04-21 NOTE — NUR
chart review. cm cont to wear face mask and shield during visit. he was up in
bed, wife at bedside. intro to cm and dcp. pod # cabg. ranch style home, 2
steps to enter then all living on main level. has basement, doesnt have to go
in basement. independent prior to hospital. will cont following as needed for
dc needs.

## 2021-04-21 NOTE — NUR
Patient up to chair twice today as he was in bed for line and chest tube
removal, then an xray. He worked with PT and OT. He is up to the chair for
supper. Patient progressing towards plan of care as evidenced by improved
ambulation, discontinuation of lines, and maintaining vital signs within
defined parameters. Plan of care is to continue to increase activity, continue
to maintain pain control and monitor vital signs as directed by physician.

## 2021-04-22 VITALS — DIASTOLIC BLOOD PRESSURE: 44 MMHG | SYSTOLIC BLOOD PRESSURE: 98 MMHG

## 2021-04-22 VITALS — SYSTOLIC BLOOD PRESSURE: 109 MMHG | DIASTOLIC BLOOD PRESSURE: 50 MMHG

## 2021-04-22 VITALS — SYSTOLIC BLOOD PRESSURE: 98 MMHG | DIASTOLIC BLOOD PRESSURE: 44 MMHG

## 2021-04-22 VITALS — SYSTOLIC BLOOD PRESSURE: 127 MMHG | DIASTOLIC BLOOD PRESSURE: 46 MMHG

## 2021-04-22 VITALS — SYSTOLIC BLOOD PRESSURE: 96 MMHG | DIASTOLIC BLOOD PRESSURE: 51 MMHG

## 2021-04-22 VITALS — SYSTOLIC BLOOD PRESSURE: 108 MMHG | DIASTOLIC BLOOD PRESSURE: 43 MMHG

## 2021-04-22 VITALS — DIASTOLIC BLOOD PRESSURE: 58 MMHG | SYSTOLIC BLOOD PRESSURE: 125 MMHG

## 2021-04-22 VITALS — SYSTOLIC BLOOD PRESSURE: 92 MMHG | DIASTOLIC BLOOD PRESSURE: 40 MMHG

## 2021-04-22 NOTE — NUR
PT TRANSFERED FROM ICU IN STABLE CONDITION. DENIED HAVING PAIN OR DISCOMFORT.
VSS. STERNUM GWEN DRESSING INTACT. STERNUM PRECAUTION ENFORCED. UP IN THE
CHAIR. AMBULATED X1 THIS AFTERNOON. WIFE AT THE BEDSIDE. UPDATED ON PT'S
PROGRESS AND PLAN OF CARE. NO CARDIAC OR RESPIRATORY DISTRESS NOTED. PT
PROGRRESSING WELL TOWARDS DISCHARGE GOAL.

## 2021-04-22 NOTE — NUR
Patient slept well through the night. No complaints of pain. Adequate
oxygenation on 1 liter. Heart rate and rhythm stable. Blood pressures stable.
Voiding adequate amounts per urinal. No am labs ordered for this am. Patient
is progressing towards goals. See documentation on interventions for
assessment details.

## 2021-04-22 NOTE — NUR
Nutrition: Pt seen due to consult for pt/wife requested visit, also LOS/diet
review. Pt S/P CABG x 4 on 4/19. Transferred from ICU to CCU. Eating %
of meals on Carb controlled, low fat diet.  Has glucerna ordered TID. will
decrease to once daily. Wife with several concerns related to BG control,
ordering meals, etc. After discussion, questions answered and concerns
resolved. No weight changes prior to admit but currently up with fluid. UBW
reported as 170#. Heart healthy diet guidelines reviewed most of which pt/wife
are already doing. Assisted with dinner order, wife will order meals from here
on. Low nutrition risk.

## 2021-04-23 VITALS — SYSTOLIC BLOOD PRESSURE: 107 MMHG | DIASTOLIC BLOOD PRESSURE: 50 MMHG

## 2021-04-23 VITALS — DIASTOLIC BLOOD PRESSURE: 50 MMHG | SYSTOLIC BLOOD PRESSURE: 107 MMHG

## 2021-04-23 VITALS — SYSTOLIC BLOOD PRESSURE: 113 MMHG | DIASTOLIC BLOOD PRESSURE: 49 MMHG

## 2021-04-23 VITALS — SYSTOLIC BLOOD PRESSURE: 116 MMHG | DIASTOLIC BLOOD PRESSURE: 47 MMHG

## 2021-04-23 LAB
ALBUMIN SERPL-MCNC: 2.5 G/DL (ref 3.4–5)
ALT SERPL-CCNC: 29 U/L (ref 30–65)
ANION GAP SERPL CALC-SCNC: 8 MMOL/L (ref 7–16)
AST SERPL-CCNC: 27 U/L (ref 15–37)
BASOPHILS NFR BLD AUTO: 0.5 % (ref 0–2)
BILIRUB SERPL-MCNC: 0.6 MG/DL (ref 0.2–1)
BUN SERPL-MCNC: 25 MG/DL (ref 7–18)
CALCIUM SERPL-MCNC: 8.1 MG/DL (ref 8.5–10.1)
CHLORIDE SERPL-SCNC: 102 MMOL/L (ref 98–107)
CO2 SERPL-SCNC: 26 MMOL/L (ref 21–32)
CREAT SERPL-MCNC: 1.2 MG/DL (ref 0.7–1.3)
EOSINOPHIL NFR BLD: 3.8 % (ref 0–3)
ERYTHROCYTE [DISTWIDTH] IN BLOOD BY AUTOMATED COUNT: 13 % (ref 10.5–14.5)
GLUCOSE SERPL-MCNC: 136 MG/DL (ref 74–106)
GRANULOCYTES NFR BLD MANUAL: 71.1 % (ref 36–66)
HCT VFR BLD CALC: 21.6 % (ref 42–52)
HGB BLD-MCNC: 7.3 GM/DL (ref 14–18)
LYMPHOCYTES NFR BLD AUTO: 15.2 % (ref 24–44)
MCH RBC QN AUTO: 33.8 PG (ref 26–34)
MCHC RBC AUTO-ENTMCNC: 33.7 G/DL (ref 28–37)
MCV RBC: 100.3 FL (ref 80–100)
MONOCYTES NFR BLD: 9.4 % (ref 1–8)
NEUTROPHILS # BLD: 5.1 THOU/UL (ref 1.4–8.2)
PLATELET # BLD: 134 THOU/UL (ref 150–400)
POTASSIUM SERPL-SCNC: 4.3 MMOL/L (ref 3.5–5.1)
PROT SERPL-MCNC: 5.6 G/DL (ref 6.4–8.2)
RBC # BLD AUTO: 2.16 MIL/UL (ref 4.5–6)
SODIUM SERPL-SCNC: 136 MMOL/L (ref 136–145)
WBC # BLD AUTO: 7.2 THOU/UL (ref 4–11)

## 2021-04-23 NOTE — EKG
[place] [address] [facility] [employer]
[place]  [address]
Phone:  [phone]                    ELECTROCARDIOGRAM REPORT      
_______________________________________________________________________________
 
Name:       EILEEN WALTERS [facility]           Room #:         218-P       ADM IN  
M.R.#:      [id number]     Account #:      [id number]  
Admission:  04/15/21    Attend Phys:    Liu Savage MD  
Discharge:              Date of Birth:  [birth year]  
                                                          Report #: [id number]
   [id number]
_______________________________________________________________________________
                          [facility]
                                       
Test Date:    2021               Test Time:    07:09:56
Pat Name:     EILEEN WALTERS             Department:   
Patient ID:   [id number]            Room:         218 P
Gender:       M                        Technician:   SADAF
:          1950               Requested By: Gustavo Lowry
Order Number: [id number] MD:   Herve Alvarado
                                 Measurements
Intervals                              Axis          
Rate:         67                       P:            53
AK:           159                      QRS:          22
QRSD:         92                       T:            -9
QT:           447                                    
QTc:          472                                    
                           Interpretive Statements
Sinus rhythm
Prolonged QT interval
Compared to ECG 2021 07:36:23
Ventricular-paced complex(es) or rhythm no longer present
Electronically Signed On 2021 8:42:18 CDT by Herve Alvarado
[email or web address]
 
 
 
 
 
 
 
 
 
 
 
 
 
 
 
 
 
 
 
 
 
  <ELECTRONICALLY SIGNED>
   By: Herve Alvarado MD, [facility]   
  21     0842
D: 2109                           _____________________________________
T: 21                           Herve Alvarado MD, [facility]     /EPI

## 2021-04-23 NOTE — NUR
ASSESSMENT AS CHARTED. PT ALERT AND ORIENTED. VSS. DENIED HAVING PAIN OR
DISCOMFORT. UP IN THE CHAIR THIS SHIFT. AMBULATED X3 THIS SHIFT. REPORT
FEELING MUCH BETTER. STERNUM PRECAUTION ENFORCED. ORDERS GIVEN TO DISCHARGE PT
TO HOME. DISCHARGE INSTRUCTIONS GIVEN TO PT. PT AND THE WIFE VERBERLISED
UNDERSTANDING. PT LEFT THE FACILITY ACCOMPANIED BY THE WIFE.

## 2021-04-23 NOTE — NUR
SLEPT MOST OF SHIFT. UP IN ROOM WITH STANDBY ASSIST. FOLLOWING CABG
PRECAUTIONS. WORKING ON GOALS AND PLAN OF CARE FOR NOC. PROGRESSING TOWARDS
DISCHARGE GOALS. CONTINUE TO ASSES AISHA. DENIES NEED FOR PAIN MEDICATIONS.

## 2021-04-23 NOTE — O
[facility]
[address] [place]
[place]   [address]                     OPERATIVE REPORT              
_______________________________________________________________________________
 
Name:       EILEEN WALTERS [facility]           Room #:         218-P       ADM IN  
M.R.#:      [id number]                       Account #:      [id number]  
Admission:  04/15/21    Attend Phys:    Liu Savage MD  
Discharge:              Date of Birth:  [birth year]  
                                                          Report #: [id number]
                                                                    [id number]   
_______________________________________________________________________________
THIS REPORT FOR:  
 
cc:  Bhanu Hutchinson MD, Neal A. MD Forman,Vamsi KUMAR MD                                            ~
 
DATE OF SERVICE:  04/19/2021
 
 
PREOPERATIVE DIAGNOSIS:  Coronary artery disease.
 
POSTOPERATIVE DIAGNOSIS:  Coronary artery disease.
 
OPERATION:  Coronary artery bypass x 4 including left internal mammary artery to
left anterior descending artery, saphenous vein to diagonal and marginal and
saphenous vein to posterior descending (distal circumflex branch) and endoscopic
harvest, left greater saphenous vein.
 
SURGEON:  Vamsi Kessler MD
 
ASSISTANT:  SOPHIA Walsh.
 
ANESTHESIA:  General.
 
INDICATIONS:  Patient is a 70-year-old known to our service from carotid artery
surgery last week.  The patient was diagnosed as having critical right internal
carotid stenosis and at the same time an 80% left main stenosis was seen.  The
patient appeared to be asymptomatic (relatively) from the standpoint of the
coronary disease; however, prior to discharge we learned that the patient was
having some episodes of chest discomfort.  This led us to keep the patient in
the hospital and expedite the coronary surgery.
 
FINDINGS AND TECHNIQUE:  After general anesthesia was established, saphenous
vein was harvested and prepared for use as a conduit.
 
Exposure was obtained through median sternotomy.  Left internal mammary artery
was harvested from chest wall.  Pericardial well was made.  Cannulation sutures
were placed.  Heparin was given.  Aorta was cannulated.  Right atrium was
cannulated.  Cardioplegia needle was positioned in the aortic root.  Retrograde
cardioplegic catheter was placed in coronary sinus.  Cardiopulmonary bypass was
established.  The aorta was cross clamped.  Antegrade and retrograde
cardioplegia were given.  Ice was poured in the pericardial well.  The heart was
stopped.
 
During electromechanical arrest, the distal anastomoses were performed and
end-to-side anastomosis was made between vein and the posterior descending
 
 
 
[facility]
[address]
[place]   [address]                     OPERATIVE REPORT              
_______________________________________________________________________________
 
Name:       EILEEN WALTERS [facility]           Room #:         218-P       [facility] [place]  
M.R.#:      [id number]                       Account #:      [id number]  
Admission:  04/15/21    Attend Phys:    Liu Savage MD  
Discharge:              Date of Birth:  [birth year]  
                                                          Report #: [id number]
                                                                    [id number]   
_______________________________________________________________________________
 
branch of the left dominant circumflex.  Cold cardioplegia was given.  Separate
segment of vein was sewn to the large obtuse marginal artery.  Cold cardioplegia
was given.  The same segment of vein was sewn in side-to-side fashion to the
diagonal artery.  Cold cardioplegia was given.  Left internal mammary artery was
sewn in end-to-side fashion to the left anterior descending artery.  Patency of
this vessel was checked with the temperature technique.  Cold cardioplegia was
given.  Two proximal anastomoses were performed.  When these were complete, warm
retrograde cardioplegia was given followed by warm continuous blood to the
coronary sinus.  Venous cannula was removed.  Protamine was given.  The aortic
cannula was removed.  Flows were measured in the bypass graft.
 
When hemostasis was satisfactory, chest was irrigated with antibiotic solution
and closed in the usual fashion.  The patient was taken to the Intensive Care
Unit in good condition having tolerated the procedure well.  All counts reported
as correct.
 
 
 
 
 
 
 
 
 
 
 
 
 
 
 
 
 
 
 
 
 
 
 
 
 
 
 
 
  <ELECTRONICALLY SIGNED>
   By: Vamsi Kessler MD            
  04/23/21     1259
D: 04/19/21 1535                           _____________________________________
T: 04/19/21 1547                           Vamsi Kessler MD              /nt

## 2021-06-02 ENCOUNTER — HOSPITAL ENCOUNTER (OUTPATIENT)
Dept: HOSPITAL 35 - SJCVC | Age: 71
End: 2021-06-02
Attending: INTERNAL MEDICINE
Payer: COMMERCIAL

## 2021-06-02 DIAGNOSIS — Z87.891: ICD-10-CM

## 2021-06-02 DIAGNOSIS — Z88.5: ICD-10-CM

## 2021-06-02 DIAGNOSIS — Z72.89: ICD-10-CM

## 2021-06-02 DIAGNOSIS — E78.2: Primary | ICD-10-CM

## 2021-06-02 DIAGNOSIS — Z95.1: ICD-10-CM

## 2021-06-02 DIAGNOSIS — E78.5: ICD-10-CM

## 2021-06-02 DIAGNOSIS — I10: ICD-10-CM

## 2021-06-02 DIAGNOSIS — Z95.818: ICD-10-CM

## 2021-06-02 DIAGNOSIS — I77.89: ICD-10-CM

## 2021-06-02 DIAGNOSIS — E11.9: ICD-10-CM

## 2021-06-08 ENCOUNTER — HOSPITAL ENCOUNTER (OUTPATIENT)
Dept: HOSPITAL 35 - SJCVCIMAG | Age: 71
End: 2021-06-08
Attending: RADIOLOGY
Payer: COMMERCIAL

## 2021-06-08 DIAGNOSIS — Z98.890: ICD-10-CM

## 2021-06-08 DIAGNOSIS — Z79.84: ICD-10-CM

## 2021-06-08 DIAGNOSIS — I77.9: ICD-10-CM

## 2021-06-08 DIAGNOSIS — I25.110: ICD-10-CM

## 2021-06-08 DIAGNOSIS — Z79.82: ICD-10-CM

## 2021-06-08 DIAGNOSIS — Z87.891: ICD-10-CM

## 2021-06-08 DIAGNOSIS — E78.2: ICD-10-CM

## 2021-06-08 DIAGNOSIS — Z79.899: ICD-10-CM

## 2021-06-08 DIAGNOSIS — I10: ICD-10-CM

## 2021-06-08 DIAGNOSIS — Z95.1: ICD-10-CM

## 2021-06-08 DIAGNOSIS — Z88.8: ICD-10-CM

## 2021-06-08 DIAGNOSIS — E11.9: ICD-10-CM

## 2021-06-08 DIAGNOSIS — I65.23: Primary | ICD-10-CM

## 2021-06-17 ENCOUNTER — HOSPITAL ENCOUNTER (OUTPATIENT)
Dept: HOSPITAL 35 - SJCVCIMAG | Age: 71
End: 2021-06-17
Attending: INTERNAL MEDICINE
Payer: COMMERCIAL

## 2021-06-17 DIAGNOSIS — Z79.899: ICD-10-CM

## 2021-06-17 DIAGNOSIS — Z98.890: ICD-10-CM

## 2021-06-17 DIAGNOSIS — Z95.1: ICD-10-CM

## 2021-06-17 DIAGNOSIS — I77.9: ICD-10-CM

## 2021-06-17 DIAGNOSIS — I25.110: Primary | ICD-10-CM

## 2021-06-17 DIAGNOSIS — E78.00: ICD-10-CM

## 2021-06-17 DIAGNOSIS — E11.9: ICD-10-CM

## 2021-06-17 DIAGNOSIS — Z88.8: ICD-10-CM

## 2021-06-17 DIAGNOSIS — Z79.84: ICD-10-CM

## 2021-06-17 DIAGNOSIS — E78.5: ICD-10-CM

## 2021-06-17 DIAGNOSIS — Z79.82: ICD-10-CM

## 2021-06-17 DIAGNOSIS — Z87.891: ICD-10-CM

## 2021-06-17 DIAGNOSIS — I10: ICD-10-CM

## 2023-09-27 NOTE — NUR
A-LINE DC'C THIS AFTERNOON IN PREPARATION TO DC HOME. INSTRUCTIONS ON I.S.
GIVEN. DC ORDERS CANCELLED. Vac papers started given to case manger, noted possible vac placement and dc on Friday.